# Patient Record
Sex: FEMALE | Race: OTHER | NOT HISPANIC OR LATINO | Employment: OTHER | URBAN - METROPOLITAN AREA
[De-identification: names, ages, dates, MRNs, and addresses within clinical notes are randomized per-mention and may not be internally consistent; named-entity substitution may affect disease eponyms.]

---

## 2023-12-21 ENCOUNTER — APPOINTMENT (OUTPATIENT)
Dept: GENERAL RADIOLOGY | Facility: HOSPITAL | Age: 63
End: 2023-12-21

## 2023-12-21 ENCOUNTER — HOSPITAL ENCOUNTER (INPATIENT)
Facility: HOSPITAL | Age: 63
LOS: 8 days | Discharge: HOME | End: 2023-12-29
Attending: EMERGENCY MEDICINE | Admitting: INTERNAL MEDICINE

## 2023-12-21 ENCOUNTER — APPOINTMENT (OUTPATIENT)
Dept: CT IMAGING | Facility: HOSPITAL | Age: 63
End: 2023-12-21

## 2023-12-21 DIAGNOSIS — R13.10 DYSPHAGIA, UNSPECIFIED TYPE: ICD-10-CM

## 2023-12-21 DIAGNOSIS — E11.11 DIABETIC KETOACIDOSIS WITH COMA ASSOCIATED WITH TYPE 2 DIABETES MELLITUS: ICD-10-CM

## 2023-12-21 DIAGNOSIS — G93.41 SEPSIS WITH ENCEPHALOPATHY WITHOUT SEPTIC SHOCK, DUE TO UNSPECIFIED ORGANISM: Primary | ICD-10-CM

## 2023-12-21 DIAGNOSIS — R65.20 SEPSIS WITH ENCEPHALOPATHY WITHOUT SEPTIC SHOCK, DUE TO UNSPECIFIED ORGANISM: Primary | ICD-10-CM

## 2023-12-21 DIAGNOSIS — A41.9 SEPSIS WITH ENCEPHALOPATHY WITHOUT SEPTIC SHOCK, DUE TO UNSPECIFIED ORGANISM: Primary | ICD-10-CM

## 2023-12-21 PROBLEM — E11.10 DKA (DIABETIC KETOACIDOSIS): Status: ACTIVE | Noted: 2023-12-21

## 2023-12-21 LAB
ALBUMIN SERPL-MCNC: 5.3 G/DL (ref 3.5–5.2)
ALBUMIN/GLOB SERPL: 1.4 G/DL
ALP SERPL-CCNC: 239 U/L (ref 39–117)
ALT SERPL W P-5'-P-CCNC: 33 U/L (ref 1–33)
AMMONIA BLD-SCNC: 31 UMOL/L (ref 11–51)
AMPHET+METHAMPHET UR QL: NEGATIVE
ANION GAP SERPL CALCULATED.3IONS-SCNC: 37 MMOL/L (ref 5–15)
ARTERIAL PATENCY WRIST A: POSITIVE
AST SERPL-CCNC: 18 U/L (ref 1–32)
ATMOSPHERIC PRESS: 758.1 MMHG
B-OH-BUTYR SERPL-SCNC: 10.9 MMOL/L (ref 0.02–0.27)
BARBITURATES UR QL SCN: NEGATIVE
BASE EXCESS BLDA CALC-SCNC: -10.9 MMOL/L (ref 0–2)
BDY SITE: ABNORMAL
BENZODIAZ UR QL SCN: NEGATIVE
BILIRUB SERPL-MCNC: 0.5 MG/DL (ref 0–1.2)
BUN SERPL-MCNC: 63 MG/DL (ref 8–23)
BUN/CREAT SERPL: 24.6 (ref 7–25)
CALCIUM SPEC-SCNC: 10.8 MG/DL (ref 8.6–10.5)
CANNABINOIDS SERPL QL: NEGATIVE
CHLORIDE SERPL-SCNC: 98 MMOL/L (ref 98–107)
CO2 BLDA-SCNC: 14.6 MMOL/L (ref 23–27)
CO2 SERPL-SCNC: 16 MMOL/L (ref 22–29)
COCAINE UR QL: NEGATIVE
CREAT SERPL-MCNC: 2.56 MG/DL (ref 0.57–1)
D-LACTATE SERPL-SCNC: 5.6 MMOL/L (ref 0.5–2)
DEPRECATED RDW RBC AUTO: 47.1 FL (ref 37–54)
DEVICE COMMENT: ABNORMAL
EGFRCR SERPLBLD CKD-EPI 2021: 20.4 ML/MIN/1.73
ERYTHROCYTE [DISTWIDTH] IN BLOOD BY AUTOMATED COUNT: 12.8 % (ref 12.3–15.4)
ETHANOL BLD-MCNC: <10 MG/DL (ref 0–10)
ETHANOL UR QL: <0.01 %
FENTANYL UR-MCNC: NEGATIVE NG/ML
GLOBULIN UR ELPH-MCNC: 3.7 GM/DL
GLUCOSE SERPL-MCNC: 1004 MG/DL (ref 65–99)
HCO3 BLDA-SCNC: 13.7 MMOL/L (ref 22–28)
HCT VFR BLD AUTO: 59 % (ref 34–46.6)
HEMODILUTION: NO
HGB BLD-MCNC: 18.3 G/DL (ref 12–15.9)
LYMPHOCYTES # BLD MANUAL: 1.48 10*3/MM3 (ref 0.7–3.1)
LYMPHOCYTES NFR BLD MANUAL: 3.1 % (ref 5–12)
Lab: ABNORMAL
MCH RBC QN AUTO: 31 PG (ref 26.6–33)
MCHC RBC AUTO-ENTMCNC: 31 G/DL (ref 31.5–35.7)
MCV RBC AUTO: 99.8 FL (ref 79–97)
METHADONE UR QL SCN: NEGATIVE
MODALITY: ABNORMAL
MONOCYTES # BLD: 0.49 10*3/MM3 (ref 0.1–0.9)
NEUTROPHILS # BLD AUTO: 13.94 10*3/MM3 (ref 1.7–7)
NEUTROPHILS NFR BLD MANUAL: 87.6 % (ref 42.7–76)
NOTIFIED WHO: ABNORMAL
OPIATES UR QL: NEGATIVE
OXYCODONE UR QL SCN: NEGATIVE
PCO2 BLDA: 28.3 MM HG (ref 35–45)
PH BLDA: 7.29 PH UNITS (ref 7.35–7.45)
PLAT MORPH BLD: NORMAL
PLATELET # BLD AUTO: 345 10*3/MM3 (ref 140–450)
PMV BLD AUTO: 13.2 FL (ref 6–12)
PO2 BLDA: 97.4 MM HG (ref 80–100)
POTASSIUM SERPL-SCNC: 5 MMOL/L (ref 3.5–5.2)
PROT SERPL-MCNC: 9 G/DL (ref 6–8.5)
RBC # BLD AUTO: 5.91 10*6/MM3 (ref 3.77–5.28)
RBC MORPH BLD: NORMAL
READ BACK: ABNORMAL
SAO2 % BLDCOA: 96.9 % (ref 92–98.5)
SODIUM SERPL-SCNC: 151 MMOL/L (ref 136–145)
TOTAL RATE: 16 BREATHS/MINUTE
TROPONIN T SERPL HS-MCNC: 13 NG/L
VARIANT LYMPHS NFR BLD MANUAL: 9.3 % (ref 19.6–45.3)
WBC MORPH BLD: NORMAL
WBC NRBC COR # BLD AUTO: 15.91 10*3/MM3 (ref 3.4–10.8)

## 2023-12-21 PROCEDURE — 82803 BLOOD GASES ANY COMBINATION: CPT

## 2023-12-21 PROCEDURE — 82140 ASSAY OF AMMONIA: CPT | Performed by: EMERGENCY MEDICINE

## 2023-12-21 PROCEDURE — 99285 EMERGENCY DEPT VISIT HI MDM: CPT

## 2023-12-21 PROCEDURE — 93010 ELECTROCARDIOGRAM REPORT: CPT | Performed by: INTERNAL MEDICINE

## 2023-12-21 PROCEDURE — 82077 ASSAY SPEC XCP UR&BREATH IA: CPT | Performed by: EMERGENCY MEDICINE

## 2023-12-21 PROCEDURE — 80053 COMPREHEN METABOLIC PANEL: CPT | Performed by: EMERGENCY MEDICINE

## 2023-12-21 PROCEDURE — 93005 ELECTROCARDIOGRAM TRACING: CPT | Performed by: EMERGENCY MEDICINE

## 2023-12-21 PROCEDURE — 87040 BLOOD CULTURE FOR BACTERIA: CPT | Performed by: EMERGENCY MEDICINE

## 2023-12-21 PROCEDURE — 80307 DRUG TEST PRSMV CHEM ANLYZR: CPT | Performed by: EMERGENCY MEDICINE

## 2023-12-21 PROCEDURE — 71045 X-RAY EXAM CHEST 1 VIEW: CPT

## 2023-12-21 PROCEDURE — 85007 BL SMEAR W/DIFF WBC COUNT: CPT | Performed by: EMERGENCY MEDICINE

## 2023-12-21 PROCEDURE — 36600 WITHDRAWAL OF ARTERIAL BLOOD: CPT

## 2023-12-21 PROCEDURE — 25810000003 SODIUM CHLORIDE 0.9 % SOLUTION: Performed by: EMERGENCY MEDICINE

## 2023-12-21 PROCEDURE — 70450 CT HEAD/BRAIN W/O DYE: CPT

## 2023-12-21 PROCEDURE — 83605 ASSAY OF LACTIC ACID: CPT | Performed by: EMERGENCY MEDICINE

## 2023-12-21 PROCEDURE — 85025 COMPLETE CBC W/AUTO DIFF WBC: CPT | Performed by: EMERGENCY MEDICINE

## 2023-12-21 PROCEDURE — 84100 ASSAY OF PHOSPHORUS: CPT | Performed by: EMERGENCY MEDICINE

## 2023-12-21 PROCEDURE — 36415 COLL VENOUS BLD VENIPUNCTURE: CPT

## 2023-12-21 PROCEDURE — 83036 HEMOGLOBIN GLYCOSYLATED A1C: CPT | Performed by: EMERGENCY MEDICINE

## 2023-12-21 PROCEDURE — 83735 ASSAY OF MAGNESIUM: CPT | Performed by: EMERGENCY MEDICINE

## 2023-12-21 PROCEDURE — 84484 ASSAY OF TROPONIN QUANT: CPT | Performed by: EMERGENCY MEDICINE

## 2023-12-21 PROCEDURE — 82010 KETONE BODYS QUAN: CPT | Performed by: EMERGENCY MEDICINE

## 2023-12-21 RX ORDER — SODIUM CHLORIDE 9 MG/ML
250 INJECTION, SOLUTION INTRAVENOUS CONTINUOUS PRN
Status: DISCONTINUED | OUTPATIENT
Start: 2023-12-21 | End: 2023-12-29

## 2023-12-21 RX ORDER — SODIUM CHLORIDE AND POTASSIUM CHLORIDE 150; 900 MG/100ML; MG/100ML
250 INJECTION, SOLUTION INTRAVENOUS CONTINUOUS PRN
Status: DISCONTINUED | OUTPATIENT
Start: 2023-12-21 | End: 2023-12-29

## 2023-12-21 RX ORDER — SODIUM CHLORIDE 9 MG/ML
40 INJECTION, SOLUTION INTRAVENOUS AS NEEDED
Status: DISCONTINUED | OUTPATIENT
Start: 2023-12-21 | End: 2023-12-29

## 2023-12-21 RX ORDER — DEXTROSE MONOHYDRATE 25 G/50ML
10-50 INJECTION, SOLUTION INTRAVENOUS
Status: DISCONTINUED | OUTPATIENT
Start: 2023-12-21 | End: 2023-12-29

## 2023-12-21 RX ORDER — SODIUM CHLORIDE 450 MG/100ML
250 INJECTION, SOLUTION INTRAVENOUS CONTINUOUS PRN
Status: DISCONTINUED | OUTPATIENT
Start: 2023-12-21 | End: 2023-12-29

## 2023-12-21 RX ORDER — DEXTROSE MONOHYDRATE, SODIUM CHLORIDE, AND POTASSIUM CHLORIDE 50; 1.49; 4.5 G/1000ML; G/1000ML; G/1000ML
150 INJECTION, SOLUTION INTRAVENOUS CONTINUOUS PRN
Status: DISCONTINUED | OUTPATIENT
Start: 2023-12-21 | End: 2023-12-29

## 2023-12-21 RX ORDER — SODIUM CHLORIDE 0.9 % (FLUSH) 0.9 %
10 SYRINGE (ML) INJECTION AS NEEDED
Status: DISCONTINUED | OUTPATIENT
Start: 2023-12-21 | End: 2023-12-29 | Stop reason: HOSPADM

## 2023-12-21 RX ORDER — DEXTROSE AND SODIUM CHLORIDE 5; .45 G/100ML; G/100ML
150 INJECTION, SOLUTION INTRAVENOUS CONTINUOUS PRN
Status: DISCONTINUED | OUTPATIENT
Start: 2023-12-21 | End: 2023-12-29

## 2023-12-21 RX ORDER — SODIUM CHLORIDE AND POTASSIUM CHLORIDE 300; 900 MG/100ML; MG/100ML
250 INJECTION, SOLUTION INTRAVENOUS CONTINUOUS PRN
Status: DISCONTINUED | OUTPATIENT
Start: 2023-12-21 | End: 2023-12-29

## 2023-12-21 RX ORDER — SODIUM CHLORIDE AND POTASSIUM CHLORIDE 150; 450 MG/100ML; MG/100ML
250 INJECTION, SOLUTION INTRAVENOUS CONTINUOUS PRN
Status: DISCONTINUED | OUTPATIENT
Start: 2023-12-21 | End: 2023-12-29

## 2023-12-21 RX ORDER — DEXTROSE MONOHYDRATE, SODIUM CHLORIDE, AND POTASSIUM CHLORIDE 50; 1.49; 9 G/1000ML; G/1000ML; G/1000ML
150 INJECTION, SOLUTION INTRAVENOUS CONTINUOUS PRN
Status: DISCONTINUED | OUTPATIENT
Start: 2023-12-21 | End: 2023-12-29

## 2023-12-21 RX ORDER — IBUPROFEN 600 MG/1
1 TABLET ORAL
Status: DISCONTINUED | OUTPATIENT
Start: 2023-12-21 | End: 2023-12-29

## 2023-12-21 RX ORDER — SODIUM CHLORIDE 0.9 % (FLUSH) 0.9 %
10 SYRINGE (ML) INJECTION AS NEEDED
Status: DISCONTINUED | OUTPATIENT
Start: 2023-12-21 | End: 2023-12-29

## 2023-12-21 RX ORDER — DEXTROSE MONOHYDRATE, SODIUM CHLORIDE, AND POTASSIUM CHLORIDE 50; 2.98; 9 G/1000ML; G/1000ML; G/1000ML
150 INJECTION, SOLUTION INTRAVENOUS CONTINUOUS PRN
Status: DISCONTINUED | OUTPATIENT
Start: 2023-12-21 | End: 2023-12-29

## 2023-12-21 RX ORDER — NICOTINE POLACRILEX 4 MG
15 LOZENGE BUCCAL
Status: DISCONTINUED | OUTPATIENT
Start: 2023-12-21 | End: 2023-12-29

## 2023-12-21 RX ORDER — DEXTROSE AND SODIUM CHLORIDE 5; .9 G/100ML; G/100ML
150 INJECTION, SOLUTION INTRAVENOUS CONTINUOUS PRN
Status: DISCONTINUED | OUTPATIENT
Start: 2023-12-21 | End: 2023-12-29

## 2023-12-21 RX ORDER — SODIUM CHLORIDE 0.9 % (FLUSH) 0.9 %
10 SYRINGE (ML) INJECTION EVERY 12 HOURS SCHEDULED
Status: DISCONTINUED | OUTPATIENT
Start: 2023-12-22 | End: 2023-12-29

## 2023-12-21 RX ORDER — DEXTROSE MONOHYDRATE, SODIUM CHLORIDE, AND POTASSIUM CHLORIDE 50; 2.98; 4.5 G/1000ML; G/1000ML; G/1000ML
150 INJECTION, SOLUTION INTRAVENOUS CONTINUOUS PRN
Status: DISCONTINUED | OUTPATIENT
Start: 2023-12-21 | End: 2023-12-29

## 2023-12-21 RX ADMIN — SODIUM CHLORIDE 1000 ML: 9 INJECTION, SOLUTION INTRAVENOUS at 22:35

## 2023-12-21 NOTE — LETTER
[facility] CASE MAN  [place] [facility] [id number]  [phone]        12/29/2023      Patient: Marnie Middleton  YOB: 1960  Date of Visit: 12/21/2023      To whom it may concern,     Marnie Middleton suffered a medical emergency on 12/21/2023 and was admitted to [facility] on same date. Marnie has been in hospital and missed her flight due to this. Marnie to be discharged from hospital today 12/29/2023.       Gayle Harry RN per Dr. Poli Warren

## 2023-12-22 LAB
ANION GAP SERPL CALCULATED.3IONS-SCNC: 11 MMOL/L (ref 5–15)
ANION GAP SERPL CALCULATED.3IONS-SCNC: 14.7 MMOL/L (ref 5–15)
ANION GAP SERPL CALCULATED.3IONS-SCNC: 20 MMOL/L (ref 5–15)
ANION GAP SERPL CALCULATED.3IONS-SCNC: 23.9 MMOL/L (ref 5–15)
ANION GAP SERPL CALCULATED.3IONS-SCNC: 31 MMOL/L (ref 5–15)
ANION GAP SERPL CALCULATED.3IONS-SCNC: 35 MMOL/L (ref 5–15)
BUN SERPL-MCNC: 27 MG/DL (ref 8–23)
BUN SERPL-MCNC: 37 MG/DL (ref 8–23)
BUN SERPL-MCNC: 38 MG/DL (ref 8–23)
BUN SERPL-MCNC: 40 MG/DL (ref 8–23)
BUN SERPL-MCNC: 55 MG/DL (ref 8–23)
BUN SERPL-MCNC: 61 MG/DL (ref 8–23)
BUN/CREAT SERPL: 22 (ref 7–25)
BUN/CREAT SERPL: 25.9 (ref 7–25)
BUN/CREAT SERPL: 26.1 (ref 7–25)
BUN/CREAT SERPL: 27 (ref 7–25)
BUN/CREAT SERPL: 27.1 (ref 7–25)
BUN/CREAT SERPL: 29.2 (ref 7–25)
CALCIUM SPEC-SCNC: 7.6 MG/DL (ref 8.6–10.5)
CALCIUM SPEC-SCNC: 7.6 MG/DL (ref 8.6–10.5)
CALCIUM SPEC-SCNC: 7.9 MG/DL (ref 8.6–10.5)
CALCIUM SPEC-SCNC: 8.4 MG/DL (ref 8.6–10.5)
CALCIUM SPEC-SCNC: 8.9 MG/DL (ref 8.6–10.5)
CALCIUM SPEC-SCNC: 9.4 MG/DL (ref 8.6–10.5)
CHLORIDE SERPL-SCNC: 111 MMOL/L (ref 98–107)
CHLORIDE SERPL-SCNC: 118 MMOL/L (ref 98–107)
CHLORIDE SERPL-SCNC: 124 MMOL/L (ref 98–107)
CHLORIDE SERPL-SCNC: 127 MMOL/L (ref 98–107)
CHLORIDE SERPL-SCNC: 129 MMOL/L (ref 98–107)
CHLORIDE SERPL-SCNC: 131 MMOL/L (ref 98–107)
CO2 SERPL-SCNC: 10 MMOL/L (ref 22–29)
CO2 SERPL-SCNC: 12.1 MMOL/L (ref 22–29)
CO2 SERPL-SCNC: 16 MMOL/L (ref 22–29)
CO2 SERPL-SCNC: 18 MMOL/L (ref 22–29)
CO2 SERPL-SCNC: 18.3 MMOL/L (ref 22–29)
CO2 SERPL-SCNC: 9 MMOL/L (ref 22–29)
CREAT SERPL-MCNC: 1.23 MG/DL (ref 0.57–1)
CREAT SERPL-MCNC: 1.3 MG/DL (ref 0.57–1)
CREAT SERPL-MCNC: 1.42 MG/DL (ref 0.57–1)
CREAT SERPL-MCNC: 1.48 MG/DL (ref 0.57–1)
CREAT SERPL-MCNC: 2.12 MG/DL (ref 0.57–1)
CREAT SERPL-MCNC: 2.25 MG/DL (ref 0.57–1)
D-LACTATE SERPL-SCNC: 1.7 MMOL/L (ref 0.5–2)
D-LACTATE SERPL-SCNC: 1.7 MMOL/L (ref 0.5–2)
D-LACTATE SERPL-SCNC: 4.3 MMOL/L (ref 0.5–2)
D-LACTATE SERPL-SCNC: 4.3 MMOL/L (ref 0.5–2)
D-LACTATE SERPL-SCNC: 5.3 MMOL/L (ref 0.5–2)
EGFRCR SERPLBLD CKD-EPI 2021: 23.8 ML/MIN/1.73
EGFRCR SERPLBLD CKD-EPI 2021: 25.6 ML/MIN/1.73
EGFRCR SERPLBLD CKD-EPI 2021: 39.4 ML/MIN/1.73
EGFRCR SERPLBLD CKD-EPI 2021: 41.4 ML/MIN/1.73
EGFRCR SERPLBLD CKD-EPI 2021: 46 ML/MIN/1.73
EGFRCR SERPLBLD CKD-EPI 2021: 49.2 ML/MIN/1.73
GEN 5 2HR TROPONIN T REFLEX: 11 NG/L
GLUCOSE BLDC GLUCOMTR-MCNC: 150 MG/DL (ref 70–130)
GLUCOSE BLDC GLUCOMTR-MCNC: 212 MG/DL (ref 70–130)
GLUCOSE BLDC GLUCOMTR-MCNC: 213 MG/DL (ref 70–130)
GLUCOSE BLDC GLUCOMTR-MCNC: 226 MG/DL (ref 70–130)
GLUCOSE BLDC GLUCOMTR-MCNC: 227 MG/DL (ref 70–130)
GLUCOSE BLDC GLUCOMTR-MCNC: 232 MG/DL (ref 70–130)
GLUCOSE BLDC GLUCOMTR-MCNC: 236 MG/DL (ref 70–130)
GLUCOSE BLDC GLUCOMTR-MCNC: 240 MG/DL (ref 70–130)
GLUCOSE BLDC GLUCOMTR-MCNC: 243 MG/DL (ref 70–130)
GLUCOSE BLDC GLUCOMTR-MCNC: 265 MG/DL (ref 70–130)
GLUCOSE BLDC GLUCOMTR-MCNC: 271 MG/DL (ref 70–130)
GLUCOSE BLDC GLUCOMTR-MCNC: 280 MG/DL (ref 70–130)
GLUCOSE BLDC GLUCOMTR-MCNC: 286 MG/DL (ref 70–130)
GLUCOSE BLDC GLUCOMTR-MCNC: 304 MG/DL (ref 70–130)
GLUCOSE BLDC GLUCOMTR-MCNC: 379 MG/DL (ref 70–130)
GLUCOSE BLDC GLUCOMTR-MCNC: 427 MG/DL (ref 70–130)
GLUCOSE BLDC GLUCOMTR-MCNC: 506 MG/DL (ref 70–130)
GLUCOSE BLDC GLUCOMTR-MCNC: 554 MG/DL (ref 70–130)
GLUCOSE SERPL-MCNC: 238 MG/DL (ref 65–99)
GLUCOSE SERPL-MCNC: 293 MG/DL (ref 65–99)
GLUCOSE SERPL-MCNC: 301 MG/DL (ref 65–99)
GLUCOSE SERPL-MCNC: 460 MG/DL (ref 65–99)
GLUCOSE SERPL-MCNC: 748 MG/DL (ref 65–99)
GLUCOSE SERPL-MCNC: 795 MG/DL (ref 65–99)
HBA1C MFR BLD: 14.7 % (ref 4.8–5.6)
MAGNESIUM SERPL-MCNC: 2.7 MG/DL (ref 1.6–2.4)
MAGNESIUM SERPL-MCNC: 2.8 MG/DL (ref 1.6–2.4)
MAGNESIUM SERPL-MCNC: 2.9 MG/DL (ref 1.6–2.4)
MAGNESIUM SERPL-MCNC: 3.5 MG/DL (ref 1.6–2.4)
MAGNESIUM SERPL-MCNC: 3.5 MG/DL (ref 1.6–2.4)
MAGNESIUM SERPL-MCNC: 4.3 MG/DL (ref 1.6–2.4)
OSMOLALITY SERPL: 417 MOSM/KG (ref 280–301)
PHOSPHATE SERPL-MCNC: 1 MG/DL (ref 2.5–4.5)
PHOSPHATE SERPL-MCNC: 1.7 MG/DL (ref 2.5–4.5)
PHOSPHATE SERPL-MCNC: 1.9 MG/DL (ref 2.5–4.5)
PHOSPHATE SERPL-MCNC: 4.2 MG/DL (ref 2.5–4.5)
PHOSPHATE SERPL-MCNC: 4.5 MG/DL (ref 2.5–4.5)
PHOSPHATE SERPL-MCNC: 6.8 MG/DL (ref 2.5–4.5)
POTASSIUM SERPL-SCNC: 3.9 MMOL/L (ref 3.5–5.2)
POTASSIUM SERPL-SCNC: 3.9 MMOL/L (ref 3.5–5.2)
POTASSIUM SERPL-SCNC: 4 MMOL/L (ref 3.5–5.2)
POTASSIUM SERPL-SCNC: 4.1 MMOL/L (ref 3.5–5.2)
POTASSIUM SERPL-SCNC: 4.1 MMOL/L (ref 3.5–5.2)
POTASSIUM SERPL-SCNC: 4.7 MMOL/L (ref 3.5–5.2)
QT INTERVAL: 321 MS
QTC INTERVAL: 458 MS
SODIUM SERPL-SCNC: 155 MMOL/L (ref 136–145)
SODIUM SERPL-SCNC: 158 MMOL/L (ref 136–145)
SODIUM SERPL-SCNC: 159 MMOL/L (ref 136–145)
SODIUM SERPL-SCNC: 160 MMOL/L (ref 136–145)
SODIUM SERPL-SCNC: 163 MMOL/L (ref 136–145)
SODIUM SERPL-SCNC: 164 MMOL/L (ref 136–145)
TROPONIN T DELTA: -2 NG/L

## 2023-12-22 PROCEDURE — 83605 ASSAY OF LACTIC ACID: CPT | Performed by: EMERGENCY MEDICINE

## 2023-12-22 PROCEDURE — 36415 COLL VENOUS BLD VENIPUNCTURE: CPT | Performed by: INTERNAL MEDICINE

## 2023-12-22 PROCEDURE — 83930 ASSAY OF BLOOD OSMOLALITY: CPT | Performed by: EMERGENCY MEDICINE

## 2023-12-22 PROCEDURE — 80048 BASIC METABOLIC PNL TOTAL CA: CPT | Performed by: INTERNAL MEDICINE

## 2023-12-22 PROCEDURE — 84484 ASSAY OF TROPONIN QUANT: CPT | Performed by: EMERGENCY MEDICINE

## 2023-12-22 PROCEDURE — 25010000002 POTASSIUM CHLORIDE PER 2 MEQ: Performed by: INTERNAL MEDICINE

## 2023-12-22 PROCEDURE — 25810000003 SEPSIS FLUID NS 0.9 % SOLUTION: Performed by: EMERGENCY MEDICINE

## 2023-12-22 PROCEDURE — 83735 ASSAY OF MAGNESIUM: CPT | Performed by: INTERNAL MEDICINE

## 2023-12-22 PROCEDURE — 82948 REAGENT STRIP/BLOOD GLUCOSE: CPT

## 2023-12-22 PROCEDURE — 84100 ASSAY OF PHOSPHORUS: CPT | Performed by: INTERNAL MEDICINE

## 2023-12-22 PROCEDURE — 25810000003 SODIUM CHLORIDE 0.9 % SOLUTION 500 ML FLEX CONT: Performed by: INTERNAL MEDICINE

## 2023-12-22 PROCEDURE — 25010000002 METOCLOPRAMIDE PER 10 MG: Performed by: INTERNAL MEDICINE

## 2023-12-22 PROCEDURE — 87040 BLOOD CULTURE FOR BACTERIA: CPT | Performed by: EMERGENCY MEDICINE

## 2023-12-22 PROCEDURE — 83605 ASSAY OF LACTIC ACID: CPT | Performed by: INTERNAL MEDICINE

## 2023-12-22 PROCEDURE — 25810000003 DEXTROSE 5 % WITH KCL 20 MEQ 20 MEQ/L SOLUTION: Performed by: INTERNAL MEDICINE

## 2023-12-22 PROCEDURE — 0 DEXTROSE 5 % SOLUTION: Performed by: INTERNAL MEDICINE

## 2023-12-22 PROCEDURE — 25810000003 SEPSIS FLUID NS 0.9 % SOLUTION: Performed by: INTERNAL MEDICINE

## 2023-12-22 PROCEDURE — 25010000002 PIPERACILLIN SOD-TAZOBACTAM PER 1 G: Performed by: EMERGENCY MEDICINE

## 2023-12-22 PROCEDURE — 80048 BASIC METABOLIC PNL TOTAL CA: CPT

## 2023-12-22 RX ORDER — POTASSIUM CHLORIDE, DEXTROSE MONOHYDRATE 150; 5 MG/100ML; G/100ML
150 INJECTION, SOLUTION INTRAVENOUS CONTINUOUS
Status: DISCONTINUED | OUTPATIENT
Start: 2023-12-22 | End: 2023-12-22

## 2023-12-22 RX ORDER — SODIUM CHLORIDE 450 MG/100ML
150 INJECTION, SOLUTION INTRAVENOUS CONTINUOUS
Status: DISCONTINUED | OUTPATIENT
Start: 2023-12-22 | End: 2023-12-22

## 2023-12-22 RX ORDER — METOCLOPRAMIDE HYDROCHLORIDE 5 MG/ML
10 INJECTION INTRAMUSCULAR; INTRAVENOUS ONCE
Status: COMPLETED | OUTPATIENT
Start: 2023-12-22 | End: 2023-12-22

## 2023-12-22 RX ORDER — SODIUM CHLORIDE 450 MG/100ML
150 INJECTION, SOLUTION INTRAVENOUS CONTINUOUS
Status: DISCONTINUED | OUTPATIENT
Start: 2023-12-22 | End: 2023-12-23

## 2023-12-22 RX ORDER — DEXTROSE MONOHYDRATE 50 MG/ML
150 INJECTION, SOLUTION INTRAVENOUS CONTINUOUS
Status: DISCONTINUED | OUTPATIENT
Start: 2023-12-22 | End: 2023-12-22 | Stop reason: ALTCHOICE

## 2023-12-22 RX ADMIN — POTASSIUM CHLORIDE AND SODIUM CHLORIDE 250 ML/HR: 450; 150 INJECTION, SOLUTION INTRAVENOUS at 08:06

## 2023-12-22 RX ADMIN — SODIUM CHLORIDE 150 ML/HR: 4.5 INJECTION, SOLUTION INTRAVENOUS at 11:30

## 2023-12-22 RX ADMIN — INSULIN HUMAN 5.4 UNITS/HR: 1 INJECTION, SOLUTION INTRAVENOUS at 00:26

## 2023-12-22 RX ADMIN — POTASSIUM CHLORIDE AND SODIUM CHLORIDE 250 ML/HR: 450; 150 INJECTION, SOLUTION INTRAVENOUS at 12:23

## 2023-12-22 RX ADMIN — Medication 10 ML: at 08:36

## 2023-12-22 RX ADMIN — POTASSIUM CHLORIDE AND DEXTROSE MONOHYDRATE 150 ML/HR: 150; 5 INJECTION, SOLUTION INTRAVENOUS at 14:30

## 2023-12-22 RX ADMIN — POTASSIUM PHOSPHATE, MONOBASIC POTASSIUM PHOSPHATE, DIBASIC 20 MMOL: 224; 236 INJECTION, SOLUTION, CONCENTRATE INTRAVENOUS at 15:00

## 2023-12-22 RX ADMIN — POTASSIUM CHLORIDE AND DEXTROSE MONOHYDRATE 150 ML/HR: 150; 5 INJECTION, SOLUTION INTRAVENOUS at 21:40

## 2023-12-22 RX ADMIN — INSULIN HUMAN 31.3 UNITS/HR: 1 INJECTION, SOLUTION INTRAVENOUS at 20:16

## 2023-12-22 RX ADMIN — INSULIN HUMAN 43.5 UNITS/HR: 1 INJECTION, SOLUTION INTRAVENOUS at 22:42

## 2023-12-22 RX ADMIN — INSULIN HUMAN 25.6 UNITS/HR: 1 INJECTION, SOLUTION INTRAVENOUS at 22:54

## 2023-12-22 RX ADMIN — SODIUM CHLORIDE 150 ML/HR: 4.5 INJECTION, SOLUTION INTRAVENOUS at 22:55

## 2023-12-22 RX ADMIN — TAZOBACTAM SODIUM AND PIPERACILLIN SODIUM 3.38 G: 375; 3 INJECTION, SOLUTION INTRAVENOUS at 00:08

## 2023-12-22 RX ADMIN — INSULIN HUMAN 10.1 UNITS/HR: 1 INJECTION, SOLUTION INTRAVENOUS at 12:51

## 2023-12-22 RX ADMIN — SODIUM CHLORIDE 1938 ML: 9 INJECTION, SOLUTION INTRAVENOUS at 00:08

## 2023-12-22 RX ADMIN — POTASSIUM CHLORIDE AND SODIUM CHLORIDE 250 ML/HR: 450; 150 INJECTION, SOLUTION INTRAVENOUS at 03:03

## 2023-12-22 RX ADMIN — Medication 10 ML: at 20:24

## 2023-12-22 RX ADMIN — DEXTROSE MONOHYDRATE 150 ML/HR: 50 INJECTION, SOLUTION INTRAVENOUS at 13:49

## 2023-12-22 RX ADMIN — SODIUM CHLORIDE 1000 ML: 9 INJECTION, SOLUTION INTRAVENOUS at 02:54

## 2023-12-22 RX ADMIN — METOCLOPRAMIDE 10 MG: 5 INJECTION, SOLUTION INTRAMUSCULAR; INTRAVENOUS at 13:16

## 2023-12-22 RX ADMIN — Medication 10 ML: at 02:55

## 2023-12-22 NOTE — PLAN OF CARE
Goal Outcome Evaluation:         Spoke with RN. Patient not appropriate for swallow evaluation this AM. Will continue to follow and complete evaluation as appropriate.

## 2023-12-22 NOTE — PLAN OF CARE
Goal Outcome Evaluation:      Arrived to unit 0100, septic bolus protocol finished, glucomander protocol followed (see all labs), VSS, pt oriented to self and place only, admissions questions were unable to be answered, family friends (whom pt was in town visiting were unable to provide any medical information). Sons names are listed in chart and will try contacting in AM.

## 2023-12-22 NOTE — PROGRESS NOTES
Patient was switched to half-normal saline but continued increase in sodium levels.  Therefore switched to D5 water.  Will review next BMP.  Patient will therefore need to be transition to subcu insulin NON-Glucomander order set when her DKA is resolved.    Electronically signed by Nahun Hernandez MD, 12/22/23, 3:39 PM EST.

## 2023-12-22 NOTE — CASE MANAGEMENT/SOCIAL WORK
Continued Stay Note  [facility]     Patient Name: Marnie Middleton  MRN: [id number]  Today's Date: 12/22/2023    Admit Date: 12/21/2023    Plan: Follow up with patient or son Baljeet to complete screening and discharge planning assessment   Discharge Plan       Row Name 12/22/23 1416       Plan    Plan Follow up with patient or son Baljeet to complete screening and discharge planning assessment    Plan Comments CCP noted patient is confused. Outbound call to son Baljeet and [facility] left requesting callback. Outbound call to son Zeke who states he has not been in contact with his mother in a few years and directs CCP to speak with Baljeet for screening. Zeke states patient resides in [place], [place] but was in [place] visiting her friend Gloria. At discharge Zeke states they plan for Gloria to transport patient to the airport to return home where her sister will be picking her up. Zeke consents to CCP staff speaking with friend Gloria for any discharge planning needs. CCP following clinical course to assist with any discharge planning needs. Thalia BECKHAM RN/CCP                   Discharge Codes    No documentation.                       Gayle Carmona

## 2023-12-22 NOTE — H&P
"  History and Physical    Patient Name: Marnie Middleton  Age/Sex: 63 y.o. female  : 1960  MRN: [id number]    Date of Admission: 2023  Date of Encounter Visit: 23  Encounter Provider: Carey Acuna MD  Place of Service: [facility]   Patient Care Team:  Provider, No Known as PCP - General      Subjective:     Chief Complaint: DKA    History of Present Illness:  Marnie Middleton is a 63 y.o. female with no known past medical problem, unsure if the patient follow-up closely with her provider but she is from out of town and she is visiting and the family do not have the details.  Patient  Arthropathy which may getting adequate history very challenging  She was noted to be acting a little bit weird today being more lethargic, and when they checked on her she was in her bed sleeping, difficult to wake up and lethargic so she was brought in and was found to be severely dehydrated and her blood sugar was above 1000.  Workup was positive for diabetic ketoacidosis and patient has no prior diagnosis of diabetes but lately she has been having polyuria with polydipsia and most of her symptoms over the last few days do fit the profile.  Her hemoglobin A1c was 14.7, the patient is complaining of very dry mouth  There was no fever or chills no productive cough, no vomiting, no diarrhea, she was having excessive urination, she was drinking a lot of sodas according to the family over the last few days.  They are not aware of any weight loss and the patient is unable to answer that question  Patient reported that she takes some ibuprofen over-the-counter otherwise she does not take any prescription medication    Pulmonary Functions Testing Results:    No results found for: \"FEV1\", \"FVC\", \"QZX5LMN\", \"TLC\", \"DLCO\"    Review of Systems:   Review of Systems  As per above otherwise limited given her current mental status    Past Medical History:  No past medical history on file.    No past surgical history on " file.    Home Medications:   (Not in a hospital admission)      Inpatient Medications:  Scheduled Meds:sepsis fluid NS, 30 mL/kg, Intravenous, Once  sepsis fluid NS, 30 mL/kg, Intravenous, Once  sodium chloride, 10 mL, Intravenous, Q12H  vancomycin, 20 mg/kg, Intravenous, Once      Continuous Infusions:dextrose 5 % and sodium chloride 0.45 %, 150 mL/hr  dextrose 5 % and sodium chloride 0.45 % with KCl 20 mEq/L, 150 mL/hr  dextrose 5 % and sodium chloride 0.45 % with KCl 40 mEq/L, 150 mL/hr  dextrose 5 % and sodium chloride 0.9 %, 150 mL/hr  dextrose 5 % and sodium chloride 0.9 % with KCl 20 mEq, 150 mL/hr  dextrose 5% and sodium chloride 0.9% with KCl 40 mEq/L, 150 mL/hr  insulin, 0-100 Units/hr, Last Rate: 5.4 Units/hr (12/22/23 0026)  sodium chloride 0.45 % 1,000 mL with potassium chloride 40 mEq infusion, 250 mL/hr  sodium chloride, 250 mL/hr  sodium chloride 0.45 % with KCl 20 mEq, 250 mL/hr  sodium chloride, 1,000 mL/hr  sodium chloride, 250 mL/hr  sodium chloride 0.9 % with KCl 20 mEq, 250 mL/hr  sodium chloride 0.9 % with KCl 40 mEq/L, 250 mL/hr      PRN Meds:.  Calcium Replacement - Follow Nurse / BPA Driven Protocol    dextrose    dextrose    dextrose 5 % and sodium chloride 0.45 %    dextrose 5 % and sodium chloride 0.45 % with KCl 20 mEq/L    dextrose 5 % and sodium chloride 0.45 % with KCl 40 mEq/L    dextrose 5 % and sodium chloride 0.9 %    dextrose 5 % and sodium chloride 0.9 % with KCl 20 mEq    dextrose 5% and sodium chloride 0.9% with KCl 40 mEq/L    glucagon (human recombinant)    Magnesium Standard Dose Replacement - Follow Nurse / BPA Driven Protocol    Phosphorus Replacement - Follow Nurse / BPA Driven Protocol    Potassium Replacement - Follow Nurse / BPA Driven Protocol    sodium chloride 0.45 % 1,000 mL with potassium chloride 40 mEq infusion    sodium chloride    sodium chloride 0.45 % with KCl 20 mEq    [COMPLETED] Insert Peripheral IV **AND** sodium chloride    sodium chloride    sodium  chloride    sodium chloride    sodium chloride 0.9 % with KCl 20 mEq    sodium chloride 0.9 % with KCl 40 mEq/L    Allergies:  Allergies   Allergen Reactions    Aspartame And Phenylalanine Headache    Aspirin Headache       Past Social History:  Social History     Socioeconomic History    Marital status: Single       Past Family History:  No family history on file.        Objective:   Temp:  [97.8 °F (36.6 °C)] 97.8 °F (36.6 °C)  Heart Rate:  [110-123] 123  Resp:  [24] 24  BP: (113-120)/(77-92) 113/77   SpO2:  [92 %-95 %] 95 %  on    Device (Oxygen Therapy): room air    Intake/Output Summary (Last 24 hours) at 12/22/2023 0041  Last data filed at 12/22/2023 0036  Gross per 24 hour   Intake 1025 ml   Output --   Net 1025 ml     Body mass index is 28.78 kg/m².      12/21/23  2331   Weight: 64.6 kg (142 lb 8 oz)     Weight change:     Physical Exam:   Physical Exam   General:  Sickly looking, lethargic but responsive                   Head:    Normocephalic, atraumatic.   Eyes:          Conjunctivae and sclerae normal, no icterus, PERRLA   Throat:   No oral lesions, no thrush, very dry mucous membrane   Neck:   Supple, trachea midline.   Lungs:     Normal chest on inspection, CTAB, no wheezes. No rhonchi. No crackles. Respirations regular, even and unlabored.     Heart:    Regular rhythm and normal rate.  No murmurs, gallops, or rubs noted.   Abdomen:     Soft, tender to palpation with no rebound , non-distended, positive bowel sounds.    Extremities:   No clubbing, cyanosis, or edema.     Pulses:   Pulses palpable and equal bilaterally.    Skin:   No bleeding or rash.   Neuro:   Non-focal.  Moves all extremities well.  Very lethargic   Psychiatric: Lethargic and withdrawn     Lab Review:   Results from last 7 days   Lab Units 12/21/23  2202   SODIUM mmol/L 151*   POTASSIUM mmol/L 5.0   CHLORIDE mmol/L 98   CO2 mmol/L 16.0*   BUN mg/dL 63*   CREATININE mg/dL 2.56*   GLUCOSE mg/dL 1,004*   CALCIUM mg/dL 10.8*   AST  "(SGOT) U/L 18   ALT (SGPT) U/L 33   ALBUMIN g/dL 5.3*     Results from last 7 days   Lab Units 12/21/23  2202   HSTROP T ng/L 13     Results from last 7 days   Lab Units 12/21/23  2202   WBC 10*3/mm3 15.91*   HEMOGLOBIN g/dL 18.3*   HEMATOCRIT % 59.0*   PLATELETS 10*3/mm3 345   MCV fL 99.8*   MCH pg 31.0   MCHC g/dL 31.0*   RDW % 12.8   RDW-SD fl 47.1   MPV fL 13.2*   NEUTROS ABS 10*3/mm3 13.94*         Results from last 7 days   Lab Units 12/21/23  2202   MAGNESIUM mg/dL 4.3*           Invalid input(s): \"LDLCALC\"          Results from last 7 days   Lab Units 12/21/23  2234   PH, ARTERIAL pH units 7.294*   PCO2, ARTERIAL mm Hg 28.3*   PO2 ART mm Hg 97.4   HCO3 ART mmol/L 13.7*     Results from last 7 days   Lab Units 12/21/23  2202   LACTATE mmol/L 5.6*         Results from last 7 days   Lab Units 12/21/23  2202   AMMONIA umol/L 31                       Imaging:  Imaging Results (Most Recent)       Procedure Component Value Units Date/Time    XR Chest 1 View [[id number]] Collected: 12/22/23 0000     Updated: 12/22/23 0006    Narrative:      Portable chest radiograph     HISTORY: Altered mental status     TECHNIQUE: Single AP portable radiograph of the chest     COMPARISON: None       Impression:      FINDINGS AND IMPRESSION:  Lungs are hypoinflated. Mild asymmetric pulmonary pacification is  present within the left lung base representing atelectasis versus  pneumonia in the appropriate clinical context correlation with patient  history is recommended follow-up chest CT if clinically indicated. Given  the somewhat focal appearance, recommend follow-up chest radiograph in 6  to 8 weeks to ensure resolution and exclude any possibility neoplasm.  There appears to be a moderate to large hiatal hernia. Cardiac  silhouette is accentuated by low lung volumes..     This report was finalized on 12/22/2023 12:03 AM by Dr. Soy Yanez M.D on Workstation: [id number]       CT Head Without Contrast [[id number]] Collected: 12/21/23 " 2304     Updated: 12/21/23 2304    Narrative:        Patient: WILY BARAHONA  Time Out: 23:03  Exam(s): CT HEAD Without Contrast     EXAM:    CT Head Without Intravenous Contrast    CLINICAL HISTORY:       Mental status change, unknown cause.    TECHNIQUE:    Axial computed tomography images of the head brain without intravenous   contrast.  CTDI is 55.18 mGy and DLP is 1001.4 mGy-cm.  This CT exam was   performed according to the principle of ALARA (As Low As Reasonably   Achievable) by using one or more of the following dose reduction   techniques: automated exposure control, adjustment of the mA and or kV   according to patient size, and or use of iterative reconstruction   technique.    COMPARISON:    No relevant prior studies available.    FINDINGS:    Brain:  No intracranial hemorrhage, mass-effect or midline shift.  No   abnormal extra axial fluid.  No evidence of acute infarct.  Mild   periventricular white matter hypodensities are most consistent with   chronic microangiopathy.    Ventricles:  Unremarkable.  No ventriculomegaly.    Bones joints:  Unremarkable.  No acute fracture.    Soft tissues:  Unremarkable.    Sinuses:  Unremarkable as visualized.  No acute sinusitis.    Mastoid air cells:  Unremarkable as visualized.  No mastoid effusion.    IMPRESSION:         No acute intracranial finding.      Impression:          Electronically signed by Connie Barnes MD on 12-21-23 at 2303            I personally viewed and interpreted the patient's imaging studies.    Assessment:   DKA  Newly diagnosed diabetes mellitus, hemoglobin A1c 14.7  Severe dehydration as result of the above  Acute renal failure as a result of the above  Hyponatremia with critical sodium  when corrected for the hyperglycemia  Leukocytosis, likely stress related  Hemoconcentration with erythrocytosis  Lactic acidosis    Plan:     Patient has significant hemoconcentration, she had severe dehydration and her sodium is actually very  critically high when corrected for the hyperglycemia and need to be followed very closely  Patient is severely dehydrated and needs to be resuscitated with a fluid bolus per the DKA protocol follow-up by further adjustment probably switching to half-normal saline or D5 water if the sodium level remains critically elevated after correcting  the hyperglycemia.  Patient will be on the insulin drip per the Glucomander/DKA protocol  Renal function is expected to improve with the resuscitation and will follow Closely and the potassium is likely to drop and we will follow and replace accordingly  Leukocytosis likely stress related, the history is not suggestive of any infection and we will hold on any further antibiotics  Expect the hemoglobin to drop, this is anticipated and unlikely to be due to bleeding since patient is severely hemoconcentrated  Will trend the lactic acid level and that is expected to improve as well  Avoid any nephrotoxic agents  Patient already have CT of the head that was reassuring and her encephalopathy is definitely metabolic at this point given the amount of electrolyte and chemical derangements she have on her chemistry profile.    Discussed with the family at the bedside, the patient is currently a full code, will admit to the ICU    Time: Critical care 42 min    Carey Acuna MD  [facility]   12/22/23  00:41 EST    Dictated utilizing Dragon dictation

## 2023-12-22 NOTE — ED PROVIDER NOTES
" EMERGENCY DEPARTMENT ENCOUNTER    Room Number:  18/18  PCP: Provider, No Known  Patient Care Team:  Provider, No Known as PCP - General   Independent Historians: EMS    HPI:  Chief Complaint: Altered mental status    A complete HPI/ROS/PMH/PSH/SH/FH are unobtainable due to: Altered mental status    Chronic or social conditions impacting patient care (Social Determinants of Health): None  (Financial Resource Strain / Food Insecurity / Transportation Needs / Physical Activity / Stress / Social Connections / Intimate Partner Violence / Housing Stability)    Context: Marnie Middleton is a 63 y.o. female who presents to the ED c/o acute altered mental status.  EMS reports patient is visiting her sister from out of town.  Yesterday she seemed \"sick\" and today they noticed she had not left the room.  They found her unresponsive.  EMS was called.  EMS reports blood sugar of 280.  They tried IV access but was not able to obtain it.  They report minimal responsiveness.    Review of prior external notes (non-ED) -and- Review of prior external test results outside of this encounter: Extensive review of the EPIC system as well as [facility] reveals no prior visit notes and no prior diagnostic studies available for review.    Prescription drug monitoring program review:         PAST MEDICAL HISTORY  Active Ambulatory Problems     Diagnosis Date Noted    No Active Ambulatory Problems     Resolved Ambulatory Problems     Diagnosis Date Noted    No Resolved Ambulatory Problems     No Additional Past Medical History         PAST SURGICAL HISTORY  No past surgical history on file.      FAMILY HISTORY  No family history on file.      SOCIAL HISTORY  Social History     Socioeconomic History    Marital status: Single         ALLERGIES  Patient has no allergy information on record.        REVIEW OF SYSTEMS  Review of Systems  Included in HPI  All systems reviewed and negative except for those discussed in HPI.      PHYSICAL EXAM    I " have reviewed the triage vital signs and nursing notes.    ED Triage Vitals   Temp Heart Rate Resp BP SpO2   12/21/23 2159 12/21/23 2151 12/21/23 2157 12/21/23 2157 12/21/23 2151   97.8 °F (36.6 °C) (!) 123 24 114/92 92 %      Temp src Heart Rate Source Patient Position BP Location FiO2 (%)   12/21/23 2159 -- -- -- --   Oral           Physical Exam  GENERAL: Awake, alert, acutely ill-appearing.  Disoriented.  Appears generally weak.  Answers questions.  Follows commands.  Moves all extremities.  SKIN: Warm, dry  HENT: Normocephalic, atraumatic  EYES: no scleral icterus  CV: Tachycardic rhythm, regular rate  RESPIRATORY: normal effort, lungs clear  ABDOMEN: soft, nontender, nondistended  MUSCULOSKELETAL: no deformity  NEURO: alert, moves all extremities, follows commands                                                               LAB RESULTS  Recent Results (from the past 24 hour(s))   ECG 12 Lead Altered Mental Status    Collection Time: 12/21/23  9:57 PM   Result Value Ref Range    QT Interval 321 ms    QTC Interval 458 ms   Comprehensive Metabolic Panel    Collection Time: 12/21/23 10:02 PM    Specimen: Blood   Result Value Ref Range    Glucose 1,004 (C) 65 - 99 mg/dL    BUN 63 (H) 8 - 23 mg/dL    Creatinine 2.56 (H) 0.57 - 1.00 mg/dL    Sodium 151 (H) 136 - 145 mmol/L    Potassium 5.0 3.5 - 5.2 mmol/L    Chloride 98 98 - 107 mmol/L    CO2 16.0 (L) 22.0 - 29.0 mmol/L    Calcium 10.8 (H) 8.6 - 10.5 mg/dL    Total Protein 9.0 (H) 6.0 - 8.5 g/dL    Albumin 5.3 (H) 3.5 - 5.2 g/dL    ALT (SGPT) 33 1 - 33 U/L    AST (SGOT) 18 1 - 32 U/L    Alkaline Phosphatase 239 (H) 39 - 117 U/L    Total Bilirubin 0.5 0.0 - 1.2 mg/dL    Globulin 3.7 gm/dL    A/G Ratio 1.4 g/dL    BUN/Creatinine Ratio 24.6 7.0 - 25.0    Anion Gap 37.0 (H) 5.0 - 15.0 mmol/L    eGFR 20.4 (L) >60.0 mL/min/1.73   High Sensitivity Troponin T    Collection Time: 12/21/23 10:02 PM    Specimen: Blood   Result Value Ref Range    HS Troponin T 13 <14 ng/L    Ethanol    Collection Time: 12/21/23 10:02 PM    Specimen: Blood   Result Value Ref Range    Ethanol <10 0 - 10 mg/dL    Ethanol % <0.010 %   Ammonia    Collection Time: 12/21/23 10:02 PM    Specimen: Blood   Result Value Ref Range    Ammonia 31 11 - 51 umol/L   Lactic Acid, Plasma    Collection Time: 12/21/23 10:02 PM    Specimen: Blood   Result Value Ref Range    Lactate 5.6 (C) 0.5 - 2.0 mmol/L   CBC Auto Differential    Collection Time: 12/21/23 10:02 PM    Specimen: Blood   Result Value Ref Range    WBC 15.91 (H) 3.40 - 10.80 10*3/mm3    RBC 5.91 (H) 3.77 - 5.28 10*6/mm3    Hemoglobin 18.3 (H) 12.0 - 15.9 g/dL    Hematocrit 59.0 (H) 34.0 - 46.6 %    MCV 99.8 (H) 79.0 - 97.0 fL    MCH 31.0 26.6 - 33.0 pg    MCHC 31.0 (L) 31.5 - 35.7 g/dL    RDW 12.8 12.3 - 15.4 %    RDW-SD 47.1 37.0 - 54.0 fl    MPV 13.2 (H) 6.0 - 12.0 fL    Platelets 345 140 - 450 10*3/mm3   Beta Hydroxybutyrate Quantitative    Collection Time: 12/21/23 10:02 PM    Specimen: Blood   Result Value Ref Range    Beta-Hydroxybutyrate Quant 10.897 (H) 0.020 - 0.270 mmol/L   Manual Differential    Collection Time: 12/21/23 10:02 PM    Specimen: Blood   Result Value Ref Range    Neutrophil % 87.6 (H) 42.7 - 76.0 %    Lymphocyte % 9.3 (L) 19.6 - 45.3 %    Monocyte % 3.1 (L) 5.0 - 12.0 %    Neutrophils Absolute 13.94 (H) 1.70 - 7.00 10*3/mm3    Lymphocytes Absolute 1.48 0.70 - 3.10 10*3/mm3    Monocytes Absolute 0.49 0.10 - 0.90 10*3/mm3    RBC Morphology Normal Normal    WBC Morphology Normal Normal    Platelet Morphology Normal Normal   Blood Gas, Arterial -    Collection Time: 12/21/23 10:34 PM    Specimen: Arterial Blood   Result Value Ref Range    Site Right Radial     Rehan's Test Positive     pH, Arterial 7.294 (C) 7.350 - 7.450 pH units    pCO2, Arterial 28.3 (L) 35.0 - 45.0 mm Hg    pO2, Arterial 97.4 80.0 - 100.0 mm Hg    HCO3, Arterial 13.7 (L) 22.0 - 28.0 mmol/L    Base Excess, Arterial -10.9 (L) 0.0 - 2.0 mmol/L    O2 Saturation, Arterial  96.9 92.0 - 98.5 %    CO2 Content 14.6 (L) 23 - 27 mmol/L    Barometric Pressure for Blood Gas 758.1000 mmHg    Modality Room Air     Rate 16 Breaths/minute    Notified Who      Read Back      Notified Time      Hemodilution No     Device Comment sat 97%    Urine Drug Screen - Urine, Clean Catch    Collection Time: 12/21/23 10:52 PM    Specimen: Urine, Clean Catch   Result Value Ref Range    Amphet/Methamphet, Screen Negative Negative    Barbiturates Screen, Urine Negative Negative    Benzodiazepine Screen, Urine Negative Negative    Cocaine Screen, Urine Negative Negative    Opiate Screen Negative Negative    THC, Screen, Urine Negative Negative    Methadone Screen, Urine Negative Negative    Oxycodone Screen, Urine Negative Negative    Fentanyl, Urine Negative Negative       I ordered the above labs and independently reviewed the results.        RADIOLOGY  CT Head Without Contrast    Result Date: 12/21/2023  Patient: WILY BARAHONA  Time Out: 23:03 Exam(s): CT HEAD Without Contrast EXAM:   CT Head Without Intravenous Contrast CLINICAL HISTORY:      Mental status change, unknown cause. TECHNIQUE:   Axial computed tomography images of the head brain without intravenous contrast.  CTDI is 55.18 mGy and DLP is 1001.4 mGy-cm.  This CT exam was performed according to the principle of ALARA (As Low As Reasonably Achievable) by using one or more of the following dose reduction techniques: automated exposure control, adjustment of the mA and or kV according to patient size, and or use of iterative reconstruction technique. COMPARISON:   No relevant prior studies available. FINDINGS:   Brain:  No intracranial hemorrhage, mass-effect or midline shift.  No abnormal extra axial fluid.  No evidence of acute infarct.  Mild periventricular white matter hypodensities are most consistent with chronic microangiopathy.   Ventricles:  Unremarkable.  No ventriculomegaly.   Bones joints:  Unremarkable.  No acute fracture.   Soft  tissues:  Unremarkable.   Sinuses:  Unremarkable as visualized.  No acute sinusitis.   Mastoid air cells:  Unremarkable as visualized.  No mastoid effusion. IMPRESSION:       No acute intracranial finding.     Electronically signed by Connie Barnes MD on 12-21-23 at 2303     I ordered the above noted radiological studies. Reviewed by me and discussed with radiologist.  See dictation for official radiology interpretation.      PROCEDURES    Procedures      MEDICATIONS GIVEN IN ER    Medications   sodium chloride 0.9 % flush 10 mL (has no administration in time range)   sepsis fluid NS 0.9 % bolus 1,938 mL (has no administration in time range)   piperacillin-tazobactam (ZOSYN) 3.375 g in iso-osmotic dextrose 50 ml (premix) (has no administration in time range)   vancomycin 1250 mg/250 mL 0.9% NS IVPB ([facility]) (has no administration in time range)   sodium chloride 0.9 % flush 10 mL (has no administration in time range)   sodium chloride 0.9 % flush 10 mL (has no administration in time range)   sodium chloride 0.9 % infusion 40 mL (has no administration in time range)   dextrose (GLUTOSE) oral gel 15 g (has no administration in time range)   dextrose (D50W) (25 g/50 mL) IV injection 10-50 mL (has no administration in time range)   glucagon (GLUCAGEN) injection 1 mg (has no administration in time range)   sodium chloride 0.9 % bolus (has no administration in time range)   sodium chloride 0.9 % infusion (has no administration in time range)   sodium chloride 0.9 % with KCl 20 mEq/L infusion (has no administration in time range)   sodium chloride 0.9 % with KCl 40 mEq/L infusion (has no administration in time range)   dextrose 5 % and sodium chloride 0.9 % infusion (has no administration in time range)   dextrose 5 % and sodium chloride 0.9 % with KCl 20 mEq/L infusion (has no administration in time range)   dextrose 5 % and sodium chloride 0.9 % with KCl 40 mEq/L infusion (has no administration in time range)   sodium  chloride 0.45 % infusion (has no administration in time range)   sodium chloride 0.45 % with KCl 20 mEq/L infusion (has no administration in time range)   sodium chloride 0.45 % 1,000 mL with potassium chloride 40 mEq infusion (has no administration in time range)   dextrose 5 % and sodium chloride 0.45 % infusion (has no administration in time range)   dextrose 5 % and sodium chloride 0.45 % with KCl 20 mEq/L infusion (has no administration in time range)   dextrose 5 % and sodium chloride 0.45 % with KCl 40 mEq/L infusion (has no administration in time range)   insulin regular 1 unit/mL in 0.9% sodium chloride (Glucommander) (has no administration in time range)   Potassium Replacement - Follow Nurse / BPA Driven Protocol (has no administration in time range)   Magnesium Standard Dose Replacement - Follow Nurse / BPA Driven Protocol (has no administration in time range)   Phosphorus Replacement - Follow Nurse / BPA Driven Protocol (has no administration in time range)   Calcium Replacement - Follow Nurse / BPA Driven Protocol (has no administration in time range)   sepsis fluid NS 0.9 % bolus 1,938 mL (has no administration in time range)   sodium chloride 0.9 % bolus 1,000 mL (1,000 mL Intravenous New Bag 12/21/23 0728)         ORDERS PLACED DURING THIS VISIT:  Orders Placed This Encounter   Procedures    Blood Culture - Blood,    Blood Culture - Blood,    XR Chest 1 View    CT Head Without Contrast    Comprehensive Metabolic Panel    High Sensitivity Troponin T    Blood Gas, Arterial -    Ethanol    Urine Drug Screen - Urine, Clean Catch    Ammonia    Lactic Acid, Plasma    CBC Auto Differential    Beta Hydroxybutyrate Quantitative    STAT Lactic Acid, Reflex    High Sensitivity Troponin T 2Hr    Blood Gas, Arterial -    Manual Differential    Phosphorus    Magnesium    Osmolality, Serum    Hemoglobin A1c    Basic Metabolic Panel    Magnesium    Phosphorus    NPO Diet NPO Type: Strict NPO    Monitor Blood  Pressure    Vital Signs    Strict Intake & Output    Daily Weights    Continuous Pulse Oximetry    Saline Lock & Maintain IV Access    Corrected Serum Sodium = Measured Sodium + [1.6 x ((Glucose - 100)/100)]    Do NOT Discontinue Insulin Infusion Until 2 Hours After First Dose of Basal SQ Insulin    Prior to Initiating Glucommander™, Ensure All Prior Insulin Orders Are Discontinued    Do Not Start Insulin Infusion if Potassium < 3.3    Use a Dedicated Line for Insulin Infusion (If Possible).  May Use a Carrier Fluid of NS at KVO Rate if Insulin Rate is Insufficient to Maintain IV Patency.  Prime IV Line With Insulin Infusion    Glucommander Must Be Discontinued if Insulin Infusion is Discontinued.  If Insulin Infusion is Restarted, Previous Glucommander Settings Must Be Discontinued and Re-Entered From New Order    Once DKA Meets Resolution Criteria - Call Provider for Transition Orders From IV to SQ Insulin    Utilize the Start Meal Feature / Meal Bolus Feature in Glucommander if Patient Starts a Diet or Bolus Tube Feedings    Notify Provider - Insulin Infusion    RN to Release PRN POC Glucose Orders Per Glucommander    RN to Order STAT Glucose For Any POC Glucose <10 or >600    If Insulin Infusion is Paused - Follow Glucommander Instructions    DKA Patients With Phosphorus Level 1 or Higher Do NOT Require Replacement (While Insulin Infusing)    Inpatient Diabetes Educator Consult    Pulmonology (on-call MD unless specified)    Oxygen Therapy- Nasal Cannula; Titrate 1-6 LPM Per SpO2; 90 - 95%    POC Glucose PRN    ECG 12 Lead Altered Mental Status    Insert Peripheral IV    Insert Peripheral IV x2    Inpatient Admission    CBC & Differential    Ketone Bodies, Serum (Not performed at [place])         PROGRESS, DATA ANALYSIS, CONSULTS, AND MEDICAL DECISION MAKING    All labs have been independently interpreted by me.  All radiology studies have been reviewed by me and discussed with radiologist dictating the report.    EKG's independently viewed and interpreted by me.  Discussion below represents my analysis of pertinent findings related to patient's condition, differential diagnosis, treatment plan and final disposition.    Differential diagnosis includes but is not limited to sepsis, renal failure, electrolyte disturbance, DKA, intracranial hemorrhage, pneumonia.    Clinical Scores:              ED Course as of 12/21/23 2356   u Dec 21, 2023   2204 EKG          EKG time: 2157  Rhythm/Rate: Sinus tachycardia, rate 122  P waves and CO: Normal P, normal CO  QRS, axis: Narrow QRS, normal axis  ST and T waves: No acute    Independently Interpreted by me  No prior EKG available for comparison   [TR]   2224 CT Head Without Contrast  My independent interpretation of the CT of the head is no acute hemorrhage [TR]   2237 Lactate(!!): 5.6 [TR]   2241 pH, Arterial(!!): 7.294 [TR]   2241 Base Excess(!): -10.9 [TR]   2250 Her blood glucose point-of-care here read critical high.  Per EMS her only past medical history is esophagitis. [TR]   2254 WBC(!): 15.91 [TR]   2254 Hemoglobin(!): 18.3 [TR]   2343 Glucose(!!): 1,004 [TR]   2344 The patient is now more awake and alert.  Her glucose is 1000.  She has no history of diabetes.  Plan to initiate DKA protocol and admission to the ICU. [TR]   2348 I reviewed workup and findings with the patient and friends at the bedside.  Answered all questions.  Plan admission.  They are agreeable. [TR]   2355 Discussing with Dr. Acuna with pulmonary ICU.  He agrees to admit. [TR]      ED Course User Index  [TR] Juan Miguel Sapp MD         Critical care provider statement:     Critical care time (minutes): 30-74.    Critical care time was exclusive of:  Separately billable procedures and treating other patients    Critical care was necessary to treat or prevent imminent or life-threatening deterioration of the following conditions: CNS failure    Critical care was time spent personally by me on the following  activities:  Development of treatment plan with patient or surrogate, discussions with consultants, evaluation of patient's response to treatment, examination of patient, obtaining history from patient or surrogate, ordering and performing treatments and interventions, ordering and review of laboratory studies, ordering and review of radiographic studies, pulse oximetry, re-evaluation of patient's condition and review of old charts      I interpreted the cardiac monitor rhythm and my independent interpretation is: Sinus tachycardia, rate of 123. The patient presents with altered mental status and the cardiac monitor was ordered to monitor for arrhythmias and acute change in clinical status.    PPE: The patient wore a mask and I wore an N95 mask throughout the entire patient encounter.       AS OF 23:56 EST VITALS:    BP - 113/77  HR - (!) 123  TEMP - 97.8 °F (36.6 °C) (Oral)  O2 SATS - 95%        DIAGNOSIS  Final diagnoses:   Sepsis with encephalopathy without septic shock, due to unspecified organism   Diabetic ketoacidosis with coma associated with type 2 diabetes mellitus         DISPOSITION  ED Disposition       ED Disposition   Decision to Admit    Condition   --    Comment   Level of Care: Critical Care [6]   Diagnosis: DKA (diabetic ketoacidosis) [225561]   Admitting Physician: ERIS SHEPARD [[id number]]   Attending Physician: ERIS SHEPARD [[id number]]   Certification: I Certify That Inpatient Hospital Services Are Medically Necessary For Greater Than 2 Midnights                    Note Disclaimer: At [facility], we believe that sharing information builds trust and better relationships. You are receiving this note because you recently visited [facility]. It is possible you will see health information before a provider has talked with you about it. This kind of information can be easy to misunderstand. To help you fully understand what it means for your health, we urge you to discuss this note with your  provider.         Juan Miguel Sapp MD  12/21/23 0293

## 2023-12-23 LAB
ALBUMIN SERPL-MCNC: 2.9 G/DL (ref 3.5–5.2)
ALBUMIN SERPL-MCNC: 3.1 G/DL (ref 3.5–5.2)
ALBUMIN/GLOB SERPL: 1.4 G/DL
ALBUMIN/GLOB SERPL: 1.4 G/DL
ALP SERPL-CCNC: 111 U/L (ref 39–117)
ALP SERPL-CCNC: 98 U/L (ref 39–117)
ALT SERPL W P-5'-P-CCNC: 23 U/L (ref 1–33)
ALT SERPL W P-5'-P-CCNC: 23 U/L (ref 1–33)
ANION GAP SERPL CALCULATED.3IONS-SCNC: 10 MMOL/L (ref 5–15)
ANION GAP SERPL CALCULATED.3IONS-SCNC: 13 MMOL/L (ref 5–15)
ANION GAP SERPL CALCULATED.3IONS-SCNC: 7 MMOL/L (ref 5–15)
ANION GAP SERPL CALCULATED.3IONS-SCNC: 7.6 MMOL/L (ref 5–15)
ANION GAP SERPL CALCULATED.3IONS-SCNC: 8 MMOL/L (ref 5–15)
ANION GAP SERPL CALCULATED.3IONS-SCNC: 9 MMOL/L (ref 5–15)
AST SERPL-CCNC: 29 U/L (ref 1–32)
AST SERPL-CCNC: 30 U/L (ref 1–32)
BASOPHILS # BLD AUTO: 0.01 10*3/MM3 (ref 0–0.2)
BASOPHILS # BLD AUTO: 0.02 10*3/MM3 (ref 0–0.2)
BASOPHILS NFR BLD AUTO: 0.1 % (ref 0–1.5)
BASOPHILS NFR BLD AUTO: 0.1 % (ref 0–1.5)
BILIRUB SERPL-MCNC: 0.4 MG/DL (ref 0–1.2)
BILIRUB SERPL-MCNC: 0.5 MG/DL (ref 0–1.2)
BUN SERPL-MCNC: 12 MG/DL (ref 8–23)
BUN SERPL-MCNC: 13 MG/DL (ref 8–23)
BUN SERPL-MCNC: 15 MG/DL (ref 8–23)
BUN SERPL-MCNC: 19 MG/DL (ref 8–23)
BUN SERPL-MCNC: 23 MG/DL (ref 8–23)
BUN SERPL-MCNC: 9 MG/DL (ref 8–23)
BUN/CREAT SERPL: 13 (ref 7–25)
BUN/CREAT SERPL: 13 (ref 7–25)
BUN/CREAT SERPL: 13.6 (ref 7–25)
BUN/CREAT SERPL: 15.6 (ref 7–25)
BUN/CREAT SERPL: 16.3 (ref 7–25)
BUN/CREAT SERPL: 17.6 (ref 7–25)
BUN/CREAT SERPL: 18.6 (ref 7–25)
BUN/CREAT SERPL: 19.5 (ref 7–25)
CALCIUM SPEC-SCNC: 5.9 MG/DL (ref 8.6–10.5)
CALCIUM SPEC-SCNC: 6.9 MG/DL (ref 8.6–10.5)
CALCIUM SPEC-SCNC: 7 MG/DL (ref 8.6–10.5)
CALCIUM SPEC-SCNC: 7.5 MG/DL (ref 8.6–10.5)
CALCIUM SPEC-SCNC: 7.6 MG/DL (ref 8.6–10.5)
CALCIUM SPEC-SCNC: 7.6 MG/DL (ref 8.6–10.5)
CHLORIDE SERPL-SCNC: 119 MMOL/L (ref 98–107)
CHLORIDE SERPL-SCNC: 124 MMOL/L (ref 98–107)
CHLORIDE SERPL-SCNC: 125 MMOL/L (ref 98–107)
CHLORIDE SERPL-SCNC: 126 MMOL/L (ref 98–107)
CHLORIDE SERPL-SCNC: 127 MMOL/L (ref 98–107)
CHLORIDE SERPL-SCNC: 127 MMOL/L (ref 98–107)
CO2 SERPL-SCNC: 17 MMOL/L (ref 22–29)
CO2 SERPL-SCNC: 19 MMOL/L (ref 22–29)
CO2 SERPL-SCNC: 19 MMOL/L (ref 22–29)
CO2 SERPL-SCNC: 19.4 MMOL/L (ref 22–29)
CO2 SERPL-SCNC: 20 MMOL/L (ref 22–29)
CREAT SERPL-MCNC: 0.66 MG/DL (ref 0.57–1)
CREAT SERPL-MCNC: 0.69 MG/DL (ref 0.57–1)
CREAT SERPL-MCNC: 0.69 MG/DL (ref 0.57–1)
CREAT SERPL-MCNC: 0.77 MG/DL (ref 0.57–1)
CREAT SERPL-MCNC: 0.8 MG/DL (ref 0.57–1)
CREAT SERPL-MCNC: 0.85 MG/DL (ref 0.57–1)
CREAT SERPL-MCNC: 1.02 MG/DL (ref 0.57–1)
CREAT SERPL-MCNC: 1.18 MG/DL (ref 0.57–1)
D-LACTATE SERPL-SCNC: 1.6 MMOL/L (ref 0.5–2)
DEPRECATED RDW RBC AUTO: 41.2 FL (ref 37–54)
DEPRECATED RDW RBC AUTO: 43.6 FL (ref 37–54)
EGFRCR SERPLBLD CKD-EPI 2021: 51.7 ML/MIN/1.73
EGFRCR SERPLBLD CKD-EPI 2021: 61.6 ML/MIN/1.73
EGFRCR SERPLBLD CKD-EPI 2021: 76.6 ML/MIN/1.73
EGFRCR SERPLBLD CKD-EPI 2021: 82.4 ML/MIN/1.73
EGFRCR SERPLBLD CKD-EPI 2021: 86.3 ML/MIN/1.73
EGFRCR SERPLBLD CKD-EPI 2021: 97.1 ML/MIN/1.73
EGFRCR SERPLBLD CKD-EPI 2021: 97.1 ML/MIN/1.73
EGFRCR SERPLBLD CKD-EPI 2021: 98.1 ML/MIN/1.73
EOSINOPHIL # BLD AUTO: 0 10*3/MM3 (ref 0–0.4)
EOSINOPHIL # BLD AUTO: 0 10*3/MM3 (ref 0–0.4)
EOSINOPHIL NFR BLD AUTO: 0 % (ref 0.3–6.2)
EOSINOPHIL NFR BLD AUTO: 0 % (ref 0.3–6.2)
ERYTHROCYTE [DISTWIDTH] IN BLOOD BY AUTOMATED COUNT: 12.4 % (ref 12.3–15.4)
ERYTHROCYTE [DISTWIDTH] IN BLOOD BY AUTOMATED COUNT: 12.5 % (ref 12.3–15.4)
GLOBULIN UR ELPH-MCNC: 2.1 GM/DL
GLOBULIN UR ELPH-MCNC: 2.2 GM/DL
GLUCOSE BLDC GLUCOMTR-MCNC: 111 MG/DL (ref 70–130)
GLUCOSE BLDC GLUCOMTR-MCNC: 120 MG/DL (ref 70–130)
GLUCOSE BLDC GLUCOMTR-MCNC: 121 MG/DL (ref 70–130)
GLUCOSE BLDC GLUCOMTR-MCNC: 121 MG/DL (ref 70–130)
GLUCOSE BLDC GLUCOMTR-MCNC: 129 MG/DL (ref 70–130)
GLUCOSE BLDC GLUCOMTR-MCNC: 134 MG/DL (ref 70–130)
GLUCOSE BLDC GLUCOMTR-MCNC: 138 MG/DL (ref 70–130)
GLUCOSE BLDC GLUCOMTR-MCNC: 144 MG/DL (ref 70–130)
GLUCOSE BLDC GLUCOMTR-MCNC: 144 MG/DL (ref 70–130)
GLUCOSE BLDC GLUCOMTR-MCNC: 147 MG/DL (ref 70–130)
GLUCOSE BLDC GLUCOMTR-MCNC: 161 MG/DL (ref 70–130)
GLUCOSE BLDC GLUCOMTR-MCNC: 205 MG/DL (ref 70–130)
GLUCOSE BLDC GLUCOMTR-MCNC: 214 MG/DL (ref 70–130)
GLUCOSE BLDC GLUCOMTR-MCNC: 216 MG/DL (ref 70–130)
GLUCOSE BLDC GLUCOMTR-MCNC: 59 MG/DL (ref 70–130)
GLUCOSE BLDC GLUCOMTR-MCNC: 62 MG/DL (ref 70–130)
GLUCOSE BLDC GLUCOMTR-MCNC: 75 MG/DL (ref 70–130)
GLUCOSE BLDC GLUCOMTR-MCNC: 90 MG/DL (ref 70–130)
GLUCOSE BLDC GLUCOMTR-MCNC: 99 MG/DL (ref 70–130)
GLUCOSE SERPL-MCNC: 132 MG/DL (ref 65–99)
GLUCOSE SERPL-MCNC: 135 MG/DL (ref 65–99)
GLUCOSE SERPL-MCNC: 139 MG/DL (ref 65–99)
GLUCOSE SERPL-MCNC: 148 MG/DL (ref 65–99)
GLUCOSE SERPL-MCNC: 194 MG/DL (ref 65–99)
GLUCOSE SERPL-MCNC: 715 MG/DL (ref 65–99)
HCT VFR BLD AUTO: 34.9 % (ref 34–46.6)
HCT VFR BLD AUTO: 39.3 % (ref 34–46.6)
HGB BLD-MCNC: 11 G/DL (ref 12–15.9)
HGB BLD-MCNC: 12.8 G/DL (ref 12–15.9)
IMM GRANULOCYTES # BLD AUTO: 0.08 10*3/MM3 (ref 0–0.05)
IMM GRANULOCYTES NFR BLD AUTO: 0.6 % (ref 0–0.5)
LYMPHOCYTES # BLD AUTO: 2.01 10*3/MM3 (ref 0.7–3.1)
LYMPHOCYTES # BLD AUTO: 2.29 10*3/MM3 (ref 0.7–3.1)
LYMPHOCYTES NFR BLD AUTO: 14.2 % (ref 19.6–45.3)
LYMPHOCYTES NFR BLD AUTO: 14.8 % (ref 19.6–45.3)
MAGNESIUM SERPL-MCNC: 1.7 MG/DL (ref 1.6–2.4)
MAGNESIUM SERPL-MCNC: 1.7 MG/DL (ref 1.6–2.4)
MAGNESIUM SERPL-MCNC: 1.8 MG/DL (ref 1.6–2.4)
MAGNESIUM SERPL-MCNC: 1.9 MG/DL (ref 1.6–2.4)
MAGNESIUM SERPL-MCNC: 2 MG/DL (ref 1.6–2.4)
MAGNESIUM SERPL-MCNC: 2.1 MG/DL (ref 1.6–2.4)
MAGNESIUM SERPL-MCNC: 2.2 MG/DL (ref 1.6–2.4)
MCH RBC QN AUTO: 29.3 PG (ref 26.6–33)
MCH RBC QN AUTO: 30.3 PG (ref 26.6–33)
MCHC RBC AUTO-ENTMCNC: 31.5 G/DL (ref 31.5–35.7)
MCHC RBC AUTO-ENTMCNC: 32.6 G/DL (ref 31.5–35.7)
MCV RBC AUTO: 89.9 FL (ref 79–97)
MCV RBC AUTO: 96.1 FL (ref 79–97)
MONOCYTES # BLD AUTO: 0.66 10*3/MM3 (ref 0.1–0.9)
MONOCYTES # BLD AUTO: 0.85 10*3/MM3 (ref 0.1–0.9)
MONOCYTES NFR BLD AUTO: 4.9 % (ref 5–12)
MONOCYTES NFR BLD AUTO: 5.3 % (ref 5–12)
NEUTROPHILS NFR BLD AUTO: 10.78 10*3/MM3 (ref 1.7–7)
NEUTROPHILS NFR BLD AUTO: 12.92 10*3/MM3 (ref 1.7–7)
NEUTROPHILS NFR BLD AUTO: 79.6 % (ref 42.7–76)
NEUTROPHILS NFR BLD AUTO: 79.7 % (ref 42.7–76)
NRBC BLD AUTO-RTO: 0.1 /100 WBC (ref 0–0.2)
PHOSPHATE SERPL-MCNC: 0.6 MG/DL (ref 2.5–4.5)
PHOSPHATE SERPL-MCNC: 0.8 MG/DL (ref 2.5–4.5)
PHOSPHATE SERPL-MCNC: 1 MG/DL (ref 2.5–4.5)
PHOSPHATE SERPL-MCNC: 1.2 MG/DL (ref 2.5–4.5)
PHOSPHATE SERPL-MCNC: 1.3 MG/DL (ref 2.5–4.5)
PHOSPHATE SERPL-MCNC: 1.5 MG/DL (ref 2.5–4.5)
PHOSPHATE SERPL-MCNC: 1.6 MG/DL (ref 2.5–4.5)
PLATELET # BLD AUTO: 101 10*3/MM3 (ref 140–450)
PLATELET # BLD AUTO: 86 10*3/MM3 (ref 140–450)
PMV BLD AUTO: 11.8 FL (ref 6–12)
PMV BLD AUTO: 12 FL (ref 6–12)
POTASSIUM SERPL-SCNC: 3.2 MMOL/L (ref 3.5–5.2)
POTASSIUM SERPL-SCNC: 3.4 MMOL/L (ref 3.5–5.2)
POTASSIUM SERPL-SCNC: 3.5 MMOL/L (ref 3.5–5.2)
POTASSIUM SERPL-SCNC: 3.6 MMOL/L (ref 3.5–5.2)
POTASSIUM SERPL-SCNC: 3.6 MMOL/L (ref 3.5–5.2)
POTASSIUM SERPL-SCNC: 3.7 MMOL/L (ref 3.5–5.2)
POTASSIUM SERPL-SCNC: 3.8 MMOL/L (ref 3.5–5.2)
POTASSIUM SERPL-SCNC: 3.8 MMOL/L (ref 3.5–5.2)
PROCALCITONIN SERPL-MCNC: 0.37 NG/ML (ref 0–0.25)
PROT SERPL-MCNC: 5 G/DL (ref 6–8.5)
PROT SERPL-MCNC: 5.3 G/DL (ref 6–8.5)
RBC # BLD AUTO: 3.63 10*6/MM3 (ref 3.77–5.28)
RBC # BLD AUTO: 4.37 10*6/MM3 (ref 3.77–5.28)
SODIUM SERPL-SCNC: 143 MMOL/L (ref 136–145)
SODIUM SERPL-SCNC: 152 MMOL/L (ref 136–145)
SODIUM SERPL-SCNC: 153 MMOL/L (ref 136–145)
SODIUM SERPL-SCNC: 154 MMOL/L (ref 136–145)
SODIUM SERPL-SCNC: 156 MMOL/L (ref 136–145)
SODIUM SERPL-SCNC: 159 MMOL/L (ref 136–145)
WBC NRBC COR # BLD AUTO: 13.55 10*3/MM3 (ref 3.4–10.8)
WBC NRBC COR # BLD AUTO: 16.18 10*3/MM3 (ref 3.4–10.8)

## 2023-12-23 PROCEDURE — 84145 PROCALCITONIN (PCT): CPT | Performed by: HOSPITALIST

## 2023-12-23 PROCEDURE — 84100 ASSAY OF PHOSPHORUS: CPT | Performed by: INTERNAL MEDICINE

## 2023-12-23 PROCEDURE — 84100 ASSAY OF PHOSPHORUS: CPT | Performed by: HOSPITALIST

## 2023-12-23 PROCEDURE — 36415 COLL VENOUS BLD VENIPUNCTURE: CPT | Performed by: INTERNAL MEDICINE

## 2023-12-23 PROCEDURE — 85025 COMPLETE CBC W/AUTO DIFF WBC: CPT | Performed by: HOSPITALIST

## 2023-12-23 PROCEDURE — 25010000002 POTASSIUM CHLORIDE 10 MEQ/100ML SOLUTION: Performed by: HOSPITALIST

## 2023-12-23 PROCEDURE — 63710000001 INSULIN GLARGINE PER 5 UNITS: Performed by: INTERNAL MEDICINE

## 2023-12-23 PROCEDURE — 82948 REAGENT STRIP/BLOOD GLUCOSE: CPT

## 2023-12-23 PROCEDURE — 25010000002 ONDANSETRON PER 1 MG

## 2023-12-23 PROCEDURE — 25810000003 SODIUM CHLORIDE 0.9 % SOLUTION: Performed by: HOSPITALIST

## 2023-12-23 PROCEDURE — 80053 COMPREHEN METABOLIC PANEL: CPT | Performed by: INTERNAL MEDICINE

## 2023-12-23 PROCEDURE — 83605 ASSAY OF LACTIC ACID: CPT | Performed by: INTERNAL MEDICINE

## 2023-12-23 PROCEDURE — 25010000002 PROCHLORPERAZINE 10 MG/2ML SOLUTION: Performed by: HOSPITALIST

## 2023-12-23 PROCEDURE — 25010000002 POTASSIUM CHLORIDE 10 MEQ/100ML SOLUTION

## 2023-12-23 PROCEDURE — 25810000003 SODIUM CHLORIDE 0.9 % SOLUTION: Performed by: INTERNAL MEDICINE

## 2023-12-23 PROCEDURE — 63710000001 INSULIN LISPRO (HUMAN) PER 5 UNITS: Performed by: INTERNAL MEDICINE

## 2023-12-23 PROCEDURE — 83735 ASSAY OF MAGNESIUM: CPT | Performed by: INTERNAL MEDICINE

## 2023-12-23 PROCEDURE — 83735 ASSAY OF MAGNESIUM: CPT | Performed by: HOSPITALIST

## 2023-12-23 RX ORDER — FENTANYL/ROPIVACAINE/NS/PF 2-625MCG/1
15 PLASTIC BAG, INJECTION (ML) EPIDURAL
Qty: 500 ML | Refills: 0 | Status: COMPLETED | OUTPATIENT
Start: 2023-12-23 | End: 2023-12-23

## 2023-12-23 RX ORDER — DEXTROSE MONOHYDRATE 25 G/50ML
25 INJECTION, SOLUTION INTRAVENOUS
Status: DISCONTINUED | OUTPATIENT
Start: 2023-12-23 | End: 2023-12-29

## 2023-12-23 RX ORDER — POTASSIUM CHLORIDE 7.45 MG/ML
10 INJECTION INTRAVENOUS
Status: COMPLETED | OUTPATIENT
Start: 2023-12-23 | End: 2023-12-23

## 2023-12-23 RX ORDER — NICOTINE POLACRILEX 4 MG
15 LOZENGE BUCCAL
Status: DISCONTINUED | OUTPATIENT
Start: 2023-12-23 | End: 2023-12-29

## 2023-12-23 RX ORDER — ONDANSETRON 2 MG/ML
4 INJECTION INTRAMUSCULAR; INTRAVENOUS EVERY 6 HOURS PRN
Status: DISCONTINUED | OUTPATIENT
Start: 2023-12-23 | End: 2023-12-29 | Stop reason: HOSPADM

## 2023-12-23 RX ORDER — DEXTROSE AND SODIUM CHLORIDE 5; .45 G/100ML; G/100ML
150 INJECTION, SOLUTION INTRAVENOUS CONTINUOUS
Status: DISCONTINUED | OUTPATIENT
Start: 2023-12-23 | End: 2023-12-25

## 2023-12-23 RX ORDER — IBUPROFEN 600 MG/1
1 TABLET ORAL
Status: DISCONTINUED | OUTPATIENT
Start: 2023-12-23 | End: 2023-12-29

## 2023-12-23 RX ORDER — PROCHLORPERAZINE EDISYLATE 5 MG/ML
5 INJECTION INTRAMUSCULAR; INTRAVENOUS EVERY 4 HOURS PRN
Status: DISCONTINUED | OUTPATIENT
Start: 2023-12-23 | End: 2023-12-29 | Stop reason: HOSPADM

## 2023-12-23 RX ORDER — INSULIN LISPRO 100 [IU]/ML
4-24 INJECTION, SOLUTION INTRAVENOUS; SUBCUTANEOUS
Status: DISCONTINUED | OUTPATIENT
Start: 2023-12-23 | End: 2023-12-23

## 2023-12-23 RX ORDER — SODIUM PHOSPHATE IN 0.9 % NACL 15MMOL/100
15 PLASTIC BAG, INJECTION (ML) INTRAVENOUS
Status: COMPLETED | OUTPATIENT
Start: 2023-12-23 | End: 2023-12-24

## 2023-12-23 RX ORDER — INSULIN LISPRO 100 [IU]/ML
4-24 INJECTION, SOLUTION INTRAVENOUS; SUBCUTANEOUS
Status: DISCONTINUED | OUTPATIENT
Start: 2023-12-24 | End: 2023-12-29

## 2023-12-23 RX ORDER — INSULIN LISPRO 100 [IU]/ML
4-24 INJECTION, SOLUTION INTRAVENOUS; SUBCUTANEOUS
Status: DISCONTINUED | OUTPATIENT
Start: 2023-12-24 | End: 2023-12-23

## 2023-12-23 RX ADMIN — ONDANSETRON HYDROCHLORIDE 4 MG: 2 INJECTION, SOLUTION INTRAMUSCULAR; INTRAVENOUS at 01:19

## 2023-12-23 RX ADMIN — INSULIN GLARGINE 20 UNITS: 100 INJECTION, SOLUTION SUBCUTANEOUS at 17:38

## 2023-12-23 RX ADMIN — INSULIN HUMAN 14.7 UNITS/HR: 1 INJECTION, SOLUTION INTRAVENOUS at 07:06

## 2023-12-23 RX ADMIN — ONDANSETRON HYDROCHLORIDE 4 MG: 2 INJECTION, SOLUTION INTRAMUSCULAR; INTRAVENOUS at 07:57

## 2023-12-23 RX ADMIN — POTASSIUM PHOSPHATE, MONOBASIC POTASSIUM PHOSPHATE, DIBASIC 15 MMOL: 224; 236 INJECTION, SOLUTION, CONCENTRATE INTRAVENOUS at 10:11

## 2023-12-23 RX ADMIN — DEXTROSE AND SODIUM CHLORIDE 150 ML/HR: 5; 450 INJECTION, SOLUTION INTRAVENOUS at 04:03

## 2023-12-23 RX ADMIN — DEXTROSE AND SODIUM CHLORIDE 150 ML/HR: 5; 450 INJECTION, SOLUTION INTRAVENOUS at 18:01

## 2023-12-23 RX ADMIN — SODIUM PHOSPHATE, MONOBASIC, MONOHYDRATE AND SODIUM PHOSPHATE, DIBASIC, ANHYDROUS 15 MMOL: 142; 276 INJECTION, SOLUTION INTRAVENOUS at 21:41

## 2023-12-23 RX ADMIN — POTASSIUM CHLORIDE 10 MEQ: 7.46 INJECTION, SOLUTION INTRAVENOUS at 03:18

## 2023-12-23 RX ADMIN — DEXTROSE MONOHYDRATE 15 ML: 25 INJECTION, SOLUTION INTRAVENOUS at 01:44

## 2023-12-23 RX ADMIN — POTASSIUM CHLORIDE 10 MEQ: 7.46 INJECTION, SOLUTION INTRAVENOUS at 04:34

## 2023-12-23 RX ADMIN — DEXTROSE MONOHYDRATE 16 ML: 25 INJECTION, SOLUTION INTRAVENOUS at 01:28

## 2023-12-23 RX ADMIN — POTASSIUM CHLORIDE 10 MEQ: 7.46 INJECTION, SOLUTION INTRAVENOUS at 05:34

## 2023-12-23 RX ADMIN — POTASSIUM CHLORIDE 10 MEQ: 7.46 INJECTION, SOLUTION INTRAVENOUS at 19:06

## 2023-12-23 RX ADMIN — Medication 10 ML: at 08:55

## 2023-12-23 RX ADMIN — Medication 10 ML: at 20:50

## 2023-12-23 RX ADMIN — PROCHLORPERAZINE EDISYLATE 5 MG: 5 INJECTION INTRAMUSCULAR; INTRAVENOUS at 09:52

## 2023-12-23 RX ADMIN — POTASSIUM CHLORIDE 10 MEQ: 7.46 INJECTION, SOLUTION INTRAVENOUS at 16:44

## 2023-12-23 RX ADMIN — POTASSIUM PHOSPHATE, MONOBASIC POTASSIUM PHOSPHATE, DIBASIC 15 MMOL: 224; 236 INJECTION, SOLUTION, CONCENTRATE INTRAVENOUS at 14:32

## 2023-12-23 RX ADMIN — POTASSIUM CHLORIDE 10 MEQ: 7.46 INJECTION, SOLUTION INTRAVENOUS at 02:04

## 2023-12-23 RX ADMIN — POTASSIUM CHLORIDE 10 MEQ: 7.46 INJECTION, SOLUTION INTRAVENOUS at 17:54

## 2023-12-23 RX ADMIN — DEXTROSE AND SODIUM CHLORIDE 150 ML/HR: 5; 450 INJECTION, SOLUTION INTRAVENOUS at 10:46

## 2023-12-23 RX ADMIN — INSULIN LISPRO 8 UNITS: 100 INJECTION, SOLUTION INTRAVENOUS; SUBCUTANEOUS at 21:49

## 2023-12-23 RX ADMIN — POTASSIUM CHLORIDE 10 MEQ: 7.46 INJECTION, SOLUTION INTRAVENOUS at 20:50

## 2023-12-23 NOTE — PLAN OF CARE
Goal Outcome Evaluation:               VSS, Oriented to self, place, situation. Lethargic but much more alert by evening. Insulin drip remains on per NP (pt is unable to eat to transition). Fluids changed per NP orders (see MAR/orders). Nausea with one episode of emesis (zofran resolved n/v).

## 2023-12-23 NOTE — PROGRESS NOTES
"  Intensive Care Unit Daily Progress Note.   [facility] INTENSIVE CARE  12/23/2023    Patient Name:  Marnie Middleton  MRN:  [id number]  YOB: 1960  Age: 63 y.o.  Sex: female         Reason for Admission / Chief Complaint:  Diabetic ketoacidosis    Hospital Course:   63-year-old female who presented to the hospital on 12/22 with altered mental status and found to be in diabetic ketoacidosis initiated on insulin drip and admitted to the ICU for further management.    Interval History:  -Episode of emesis overnight  -Remains confused on examination   -States \"she wants hippopotamus for [place]ena    Physical Exam:  /83   Pulse 95   Temp 97.5 °F (36.4 °C) (Oral)   Resp 23   Ht 149.9 cm (59.02\")   Wt 63.4 kg (139 lb 12.4 oz)   SpO2 97%   BMI 28.22 kg/m²   Body mass index is 28.22 kg/m².    Intake/Output    Intake/Output Summary (Last 24 hours) at 12/23/2023 0715  Last data filed at 12/23/2023 0550  Gross per 24 hour   Intake 5844 ml   Output 400 ml   Net 5444 ml     General: Alert, altered  HEENT: NC/AT, EOMI, MMM  Neck: Supple, trachea midline  Cardiac: RRR, no murmur, gallops, rubs  Pulmonary: Clear to auscultation bilaterally, no adventitious breath sounds, normal respiratory effort  GI: Soft, non-tender, non-distended, normal bowel sounds  Extremities: Warm, well perfused, no LE edema  Skin: no visible rash  Neuro: CN II - XII grossly intact  Psychiatry: Slowed    Data Review:  Notable Labs:  Results from last 7 days   Lab Units 12/21/23  2202   WBC 10*3/mm3 15.91*   HEMOGLOBIN g/dL 18.3*   PLATELETS 10*3/mm3 345     Results from last 7 days   Lab Units 12/23/23  0404 12/23/23  0015 12/22/23  2005 12/22/23  1552 12/22/23  1213 12/22/23  0805 12/22/23  0356 12/22/23  0125   SODIUM mmol/L 156* 159* 158* 163* 164* 160* 159* 155*   POTASSIUM mmol/L 3.7 3.5 4.1 3.9 3.9 4.7 4.1 4.0   CHLORIDE mmol/L 126* 127* 129* 127* 131* 124* 118* 111*   CO2 mmol/L 20.0* 19.0* 18.0* 16.0* 18.3* " 12.1* 10.0* 9.0*   BUN mg/dL 19 23 27* 37* 38* 40* 55* 61*   CREATININE mg/dL 1.02* 1.18* 1.23* 1.42* 1.30* 1.48* 2.12* 2.25*   GLUCOSE mg/dL 194* 132* 238* 301* 293* 460* 748* 795*   CALCIUM mg/dL 7.6* 7.6* 7.6* 8.4* 7.9* 7.6* 8.9 9.4   MAGNESIUM mg/dL 2.1 2.2  --  2.9* 2.7* 2.8* 3.5* 3.5*   PHOSPHORUS mg/dL 1.0* 1.2*  --  1.7* 1.0* 1.9* 4.2 4.5   Estimated Creatinine Clearance: 48 mL/min (A) (by C-G formula based on SCr of 1.02 mg/dL (H)).    Results from last 7 days   Lab Units 12/22/23  1552 12/22/23  0624 12/22/23  0356 12/22/23  0023 12/21/23  2202   AST (SGOT) U/L  --   --   --   --  18   ALT (SGPT) U/L  --   --   --   --  33   LACTATE mmol/L 4.3* 1.7  1.7 5.3*   < > 5.6*   PLATELETS 10*3/mm3  --   --   --   --  345    < > = values in this interval not displayed.       Results from last 7 days   Lab Units 12/21/23  2234   PH, ARTERIAL pH units 7.294*   PCO2, ARTERIAL mm Hg 28.3*   PO2 ART mm Hg 97.4   HCO3 ART mmol/L 13.7*       Imaging:  Reviewed chest images personally from past 3 days    ASSESSMENT  /  PLAN:    Diabetic ketoacidosis  Hypophosphatemia  Toxic metabolic encephalopathy  Newly diagnosed diabetes mellitus, hemoglobin A1c 14.7  Severe dehydration as result of the above  Acute renal failure as a result of the above-improving  Hypernatremia  Leukocytosis, likely stress related  Lactic acidosis  Left basilar consolidation     -DKA slowly improving  -D5 half-normal saline for slowly correcting hypernatremia, continue every 4 hours BMPs, if not correcting appropriately will consider nephrology consult  -Severe hypophosphatemia, replacement per protocol and additional as needed  -Procalcitonin ordered, if elevated will initiate antibiotics  -1 episode of emesis overnight, none after Zofran.  Will monitor.  -Repeat CBC to evaluate leukocytosis, if increasing will consider antibiotics for suspected underlying infectious etiology  -Left basilar consolidation may be in the setting of aspiration.  Will  need repeat chest x-ray in 6 to 8 weeks after discharge.  -Patient will remain in the ICU due to critical state from severe electrolyte disturbances and toxic metabolic encephalopathy.    GI prophylaxis: Not indicated  DVT prophylaxis: SCDs  Ohara catheter: No  Bowel regimen: Ordered  Diet: N.p.o.    Discharge: Continue to monitor in the ICU due to critical status    Critical Care Time: 35 minutes    Prince Choudhury MD  [facility]  Pulmonary and Critical Care Medicine, Interventional Pulmonology    Parts of this note may be an electronic transcription/translation of spoken language to printed text using the Dragon dictation system.

## 2023-12-23 NOTE — PROGRESS NOTES
Reviewed BMP with RN.  Stop Glucomander as appears resolved with DKA.  Continue D5 half at current rate.  Patient has not been on insulin at home prior to this and new diagnosis diabetes.  Will start with Lantus insulin 20 units twice daily and high-dose sliding scale.  Instructed RN to continue BMP check every 6 hours.  Need to monitor for acute hypernatremia as well.    Electronically signed by Nahun Hernandez MD, 12/23/23, 4:21 PM EST.

## 2023-12-23 NOTE — PLAN OF CARE
Goal Outcome Evaluation:    SLP continues to follow for bedside swallow evaluation. D/w RN Yandy, pt remains on insulin drip, c/o nausea, vomiting. Will hold bedside swallow evaluation, complete as pt appropriate for participation and cleared for PO.

## 2023-12-24 PROBLEM — D72.829 LEUKOCYTOSIS: Status: ACTIVE | Noted: 2023-12-24

## 2023-12-24 PROBLEM — E86.0 DEHYDRATION: Status: ACTIVE | Noted: 2023-12-24

## 2023-12-24 PROBLEM — R93.89 ABNORMAL CXR: Status: ACTIVE | Noted: 2023-12-24

## 2023-12-24 PROBLEM — E87.0 HYPERNATREMIA: Status: ACTIVE | Noted: 2023-12-24

## 2023-12-24 PROBLEM — E11.9 NEW ONSET TYPE 2 DIABETES MELLITUS: Status: ACTIVE | Noted: 2023-12-24

## 2023-12-24 LAB
ANION GAP SERPL CALCULATED.3IONS-SCNC: 10 MMOL/L (ref 5–15)
ANION GAP SERPL CALCULATED.3IONS-SCNC: 8 MMOL/L (ref 5–15)
ANION GAP SERPL CALCULATED.3IONS-SCNC: 9 MMOL/L (ref 5–15)
BASOPHILS # BLD AUTO: 0.02 10*3/MM3 (ref 0–0.2)
BASOPHILS NFR BLD AUTO: 0.2 % (ref 0–1.5)
BUN SERPL-MCNC: 7 MG/DL (ref 8–23)
BUN SERPL-MCNC: 8 MG/DL (ref 8–23)
BUN SERPL-MCNC: 8 MG/DL (ref 8–23)
BUN/CREAT SERPL: 10.8 (ref 7–25)
BUN/CREAT SERPL: 12.3 (ref 7–25)
BUN/CREAT SERPL: 12.9 (ref 7–25)
CALCIUM SPEC-SCNC: 6.5 MG/DL (ref 8.6–10.5)
CALCIUM SPEC-SCNC: 8.3 MG/DL (ref 8.6–10.5)
CALCIUM SPEC-SCNC: 8.5 MG/DL (ref 8.6–10.5)
CHLORIDE SERPL-SCNC: 116 MMOL/L (ref 98–107)
CHLORIDE SERPL-SCNC: 116 MMOL/L (ref 98–107)
CHLORIDE SERPL-SCNC: 121 MMOL/L (ref 98–107)
CO2 SERPL-SCNC: 21 MMOL/L (ref 22–29)
CO2 SERPL-SCNC: 21 MMOL/L (ref 22–29)
CO2 SERPL-SCNC: 22 MMOL/L (ref 22–29)
CREAT SERPL-MCNC: 0.62 MG/DL (ref 0.57–1)
CREAT SERPL-MCNC: 0.65 MG/DL (ref 0.57–1)
CREAT SERPL-MCNC: 0.65 MG/DL (ref 0.57–1)
DEPRECATED RDW RBC AUTO: 41.6 FL (ref 37–54)
EGFRCR SERPLBLD CKD-EPI 2021: 98.5 ML/MIN/1.73
EGFRCR SERPLBLD CKD-EPI 2021: 98.5 ML/MIN/1.73
EGFRCR SERPLBLD CKD-EPI 2021: 99.6 ML/MIN/1.73
EOSINOPHIL # BLD AUTO: 0 10*3/MM3 (ref 0–0.4)
EOSINOPHIL NFR BLD AUTO: 0 % (ref 0.3–6.2)
ERYTHROCYTE [DISTWIDTH] IN BLOOD BY AUTOMATED COUNT: 12.5 % (ref 12.3–15.4)
GLUCOSE BLDC GLUCOMTR-MCNC: 216 MG/DL (ref 70–130)
GLUCOSE BLDC GLUCOMTR-MCNC: 231 MG/DL (ref 70–130)
GLUCOSE BLDC GLUCOMTR-MCNC: 232 MG/DL (ref 70–130)
GLUCOSE BLDC GLUCOMTR-MCNC: 241 MG/DL (ref 70–130)
GLUCOSE BLDC GLUCOMTR-MCNC: 261 MG/DL (ref 70–130)
GLUCOSE BLDC GLUCOMTR-MCNC: 266 MG/DL (ref 70–130)
GLUCOSE SERPL-MCNC: 237 MG/DL (ref 65–99)
GLUCOSE SERPL-MCNC: 250 MG/DL (ref 65–99)
GLUCOSE SERPL-MCNC: 303 MG/DL (ref 65–99)
HCT VFR BLD AUTO: 33.4 % (ref 34–46.6)
HGB BLD-MCNC: 11.3 G/DL (ref 12–15.9)
LYMPHOCYTES # BLD AUTO: 2.32 10*3/MM3 (ref 0.7–3.1)
LYMPHOCYTES NFR BLD AUTO: 20.8 % (ref 19.6–45.3)
MAGNESIUM SERPL-MCNC: 1.8 MG/DL (ref 1.6–2.4)
MCH RBC QN AUTO: 30.8 PG (ref 26.6–33)
MCHC RBC AUTO-ENTMCNC: 33.8 G/DL (ref 31.5–35.7)
MCV RBC AUTO: 91 FL (ref 79–97)
MONOCYTES # BLD AUTO: 0.49 10*3/MM3 (ref 0.1–0.9)
MONOCYTES NFR BLD AUTO: 4.4 % (ref 5–12)
NEUTROPHILS NFR BLD AUTO: 73.2 % (ref 42.7–76)
NEUTROPHILS NFR BLD AUTO: 8.17 10*3/MM3 (ref 1.7–7)
PHOSPHATE SERPL-MCNC: 2.8 MG/DL (ref 2.5–4.5)
PLATELET # BLD AUTO: 67 10*3/MM3 (ref 140–450)
PMV BLD AUTO: 11.7 FL (ref 6–12)
POTASSIUM SERPL-SCNC: 3.1 MMOL/L (ref 3.5–5.2)
POTASSIUM SERPL-SCNC: 3.7 MMOL/L (ref 3.5–5.2)
POTASSIUM SERPL-SCNC: 3.7 MMOL/L (ref 3.5–5.2)
RBC # BLD AUTO: 3.67 10*6/MM3 (ref 3.77–5.28)
SODIUM SERPL-SCNC: 146 MMOL/L (ref 136–145)
SODIUM SERPL-SCNC: 146 MMOL/L (ref 136–145)
SODIUM SERPL-SCNC: 152 MMOL/L (ref 136–145)
WBC NRBC COR # BLD AUTO: 11.16 10*3/MM3 (ref 3.4–10.8)

## 2023-12-24 PROCEDURE — 25010000002 PROCHLORPERAZINE 10 MG/2ML SOLUTION: Performed by: HOSPITALIST

## 2023-12-24 PROCEDURE — 25010000002 POTASSIUM CHLORIDE 10 MEQ/100ML SOLUTION

## 2023-12-24 PROCEDURE — 83735 ASSAY OF MAGNESIUM: CPT

## 2023-12-24 PROCEDURE — 63710000001 INSULIN LISPRO (HUMAN) PER 5 UNITS

## 2023-12-24 PROCEDURE — 25010000002 CALCIUM GLUCONATE-NACL 1-0.675 GM/50ML-% SOLUTION

## 2023-12-24 PROCEDURE — 80048 BASIC METABOLIC PNL TOTAL CA: CPT | Performed by: HOSPITALIST

## 2023-12-24 PROCEDURE — 82948 REAGENT STRIP/BLOOD GLUCOSE: CPT

## 2023-12-24 PROCEDURE — 25010000002 CALCIUM GLUCONATE 2-0.675 GM/100ML-% SOLUTION

## 2023-12-24 PROCEDURE — 85025 COMPLETE CBC W/AUTO DIFF WBC: CPT | Performed by: HOSPITALIST

## 2023-12-24 PROCEDURE — 25810000003 SODIUM CHLORIDE 0.9 % SOLUTION: Performed by: INTERNAL MEDICINE

## 2023-12-24 PROCEDURE — 25010000002 KETOROLAC TROMETHAMINE PER 15 MG: Performed by: NURSE PRACTITIONER

## 2023-12-24 PROCEDURE — 63710000001 INSULIN GLARGINE PER 5 UNITS: Performed by: INTERNAL MEDICINE

## 2023-12-24 PROCEDURE — 80048 BASIC METABOLIC PNL TOTAL CA: CPT

## 2023-12-24 PROCEDURE — 84100 ASSAY OF PHOSPHORUS: CPT

## 2023-12-24 RX ORDER — CALCIUM GLUCONATE 20 MG/ML
2000 INJECTION, SOLUTION INTRAVENOUS
Status: COMPLETED | OUTPATIENT
Start: 2023-12-24 | End: 2023-12-24

## 2023-12-24 RX ORDER — KETOROLAC TROMETHAMINE 15 MG/ML
15 INJECTION, SOLUTION INTRAMUSCULAR; INTRAVENOUS ONCE
Status: COMPLETED | OUTPATIENT
Start: 2023-12-24 | End: 2023-12-24

## 2023-12-24 RX ORDER — CALCIUM GLUCONATE 20 MG/ML
1000 INJECTION, SOLUTION INTRAVENOUS
Status: COMPLETED | OUTPATIENT
Start: 2023-12-24 | End: 2023-12-24

## 2023-12-24 RX ORDER — POTASSIUM CHLORIDE 7.45 MG/ML
10 INJECTION INTRAVENOUS
Status: COMPLETED | OUTPATIENT
Start: 2023-12-24 | End: 2023-12-24

## 2023-12-24 RX ADMIN — INSULIN LISPRO 8 UNITS: 100 INJECTION, SOLUTION INTRAVENOUS; SUBCUTANEOUS at 20:55

## 2023-12-24 RX ADMIN — CALCIUM GLUCONATE 2000 MG: 20 INJECTION, SOLUTION INTRAVENOUS at 06:27

## 2023-12-24 RX ADMIN — POTASSIUM CHLORIDE 10 MEQ: 7.46 INJECTION, SOLUTION INTRAVENOUS at 09:00

## 2023-12-24 RX ADMIN — INSULIN LISPRO 8 UNITS: 100 INJECTION, SOLUTION INTRAVENOUS; SUBCUTANEOUS at 05:17

## 2023-12-24 RX ADMIN — Medication 10 ML: at 08:04

## 2023-12-24 RX ADMIN — INSULIN GLARGINE 20 UNITS: 100 INJECTION, SOLUTION SUBCUTANEOUS at 20:55

## 2023-12-24 RX ADMIN — INSULIN LISPRO 12 UNITS: 100 INJECTION, SOLUTION INTRAVENOUS; SUBCUTANEOUS at 13:22

## 2023-12-24 RX ADMIN — DEXTROSE AND SODIUM CHLORIDE 150 ML/HR: 5; 450 INJECTION, SOLUTION INTRAVENOUS at 22:04

## 2023-12-24 RX ADMIN — POTASSIUM CHLORIDE 10 MEQ: 7.46 INJECTION, SOLUTION INTRAVENOUS at 11:15

## 2023-12-24 RX ADMIN — DEXTROSE AND SODIUM CHLORIDE 150 ML/HR: 5; 450 INJECTION, SOLUTION INTRAVENOUS at 07:57

## 2023-12-24 RX ADMIN — Medication 10 ML: at 20:55

## 2023-12-24 RX ADMIN — CALCIUM GLUCONATE 2000 MG: 20 INJECTION, SOLUTION INTRAVENOUS at 08:03

## 2023-12-24 RX ADMIN — POTASSIUM CHLORIDE 10 MEQ: 7.46 INJECTION, SOLUTION INTRAVENOUS at 05:17

## 2023-12-24 RX ADMIN — KETOROLAC TROMETHAMINE 15 MG: 15 INJECTION, SOLUTION INTRAMUSCULAR; INTRAVENOUS at 22:04

## 2023-12-24 RX ADMIN — INSULIN LISPRO 8 UNITS: 100 INJECTION, SOLUTION INTRAVENOUS; SUBCUTANEOUS at 16:54

## 2023-12-24 RX ADMIN — DEXTROSE AND SODIUM CHLORIDE 150 ML/HR: 5; 450 INJECTION, SOLUTION INTRAVENOUS at 00:50

## 2023-12-24 RX ADMIN — CALCIUM GLUCONATE 1000 MG: 20 INJECTION, SOLUTION INTRAVENOUS at 05:17

## 2023-12-24 RX ADMIN — DEXTROSE AND SODIUM CHLORIDE 150 ML/HR: 5; 450 INJECTION, SOLUTION INTRAVENOUS at 15:30

## 2023-12-24 RX ADMIN — POTASSIUM CHLORIDE 10 MEQ: 7.46 INJECTION, SOLUTION INTRAVENOUS at 08:00

## 2023-12-24 RX ADMIN — INSULIN GLARGINE 20 UNITS: 100 INJECTION, SOLUTION SUBCUTANEOUS at 08:11

## 2023-12-24 RX ADMIN — POTASSIUM CHLORIDE 10 MEQ: 7.46 INJECTION, SOLUTION INTRAVENOUS at 10:19

## 2023-12-24 RX ADMIN — CALCIUM GLUCONATE 2000 MG: 20 INJECTION, SOLUTION INTRAVENOUS at 11:00

## 2023-12-24 RX ADMIN — PROCHLORPERAZINE EDISYLATE 5 MG: 5 INJECTION INTRAMUSCULAR; INTRAVENOUS at 10:23

## 2023-12-24 RX ADMIN — POTASSIUM CHLORIDE 10 MEQ: 7.46 INJECTION, SOLUTION INTRAVENOUS at 06:19

## 2023-12-24 RX ADMIN — INSULIN LISPRO 8 UNITS: 100 INJECTION, SOLUTION INTRAVENOUS; SUBCUTANEOUS at 09:02

## 2023-12-24 RX ADMIN — INSULIN LISPRO 12 UNITS: 100 INJECTION, SOLUTION INTRAVENOUS; SUBCUTANEOUS at 00:27

## 2023-12-24 RX ADMIN — SODIUM PHOSPHATE, MONOBASIC, MONOHYDRATE AND SODIUM PHOSPHATE, DIBASIC, ANHYDROUS 15 MMOL: 142; 276 INJECTION, SOLUTION INTRAVENOUS at 00:37

## 2023-12-24 NOTE — PLAN OF CARE
Shift summary: Patient on SSI. Remains on D5 1/2 NS. Phosphorus, potassium and calcium replacements. Patient remains drowsy but wakes up appropriately.    Problem: Skin Injury Risk Increased  Goal: Skin Health and Integrity  Outcome: Ongoing, Progressing  Intervention: Optimize Skin Protection  Recent Flowsheet Documentation  Taken 12/24/2023 0600 by Kaye Gong RN  Head of Bed (HOB) Positioning: HOB at 30 degrees  Taken 12/24/2023 0400 by Kaye Gong RN  Head of Bed (HOB) Positioning: HOB at 20-30 degrees  Taken 12/24/2023 0200 by Kaye Gong RN  Head of Bed (HOB) Positioning: HOB at 20-30 degrees  Taken 12/24/2023 0000 by Kaye Gong RN  Head of Bed (HOB) Positioning: HOB at 20-30 degrees  Taken 12/23/2023 2200 by Kaey Gong RN  Head of Bed (HOB) Positioning: HOB at 30 degrees  Taken 12/23/2023 2000 by Kaye Gong RN  Head of Bed (HOB) Positioning: HOB at 30 degrees     Problem: Adjustment to Illness (Sepsis/Septic Shock)  Goal: Optimal Coping  Outcome: Ongoing, Progressing     Problem: Bleeding (Sepsis/Septic Shock)  Goal: Absence of Bleeding  Outcome: Ongoing, Progressing     Problem: Glycemic Control Impaired (Sepsis/Septic Shock)  Goal: Blood Glucose Level Within Desired Range  Outcome: Ongoing, Progressing     Problem: Infection Progression (Sepsis/Septic Shock)  Goal: Absence of Infection Signs and Symptoms  Outcome: Ongoing, Progressing  Intervention: Initiate Sepsis Management  Recent Flowsheet Documentation  Taken 12/24/2023 0600 by Kaye Gong RN  Infection Prevention:   environmental surveillance performed   hand hygiene promoted   personal protective equipment utilized   single patient room provided   rest/sleep promoted  Taken 12/24/2023 0500 by Kaye Gong RN  Infection Prevention:   environmental surveillance performed   hand hygiene promoted   personal protective equipment utilized   single patient room provided   rest/sleep promoted  Taken 12/24/2023 0400 by Kaye Gong  RN  Infection Prevention:   environmental surveillance performed   hand hygiene promoted   personal protective equipment utilized   single patient room provided   rest/sleep promoted  Taken 12/24/2023 0300 by Kaye Gong RN  Infection Prevention:   environmental surveillance performed   hand hygiene promoted   personal protective equipment utilized   single patient room provided   rest/sleep promoted  Taken 12/24/2023 0200 by Kaye Gong RN  Infection Prevention:   environmental surveillance performed   hand hygiene promoted   personal protective equipment utilized   single patient room provided   rest/sleep promoted  Taken 12/24/2023 0100 by Kaye Gong RN  Infection Prevention:   environmental surveillance performed   hand hygiene promoted   personal protective equipment utilized   single patient room provided   rest/sleep promoted  Taken 12/24/2023 0000 by Kaye Gong RN  Infection Prevention:   environmental surveillance performed   hand hygiene promoted   personal protective equipment utilized   single patient room provided   rest/sleep promoted  Taken 12/23/2023 2300 by Kaye Gong RN  Infection Prevention:   environmental surveillance performed   hand hygiene promoted   personal protective equipment utilized   single patient room provided   rest/sleep promoted  Taken 12/23/2023 2200 by Kaye Gong RN  Infection Prevention:   environmental surveillance performed   hand hygiene promoted   personal protective equipment utilized   single patient room provided   rest/sleep promoted  Taken 12/23/2023 2100 by Kaye Gong RN  Infection Prevention:   environmental surveillance performed   hand hygiene promoted   personal protective equipment utilized   single patient room provided   rest/sleep promoted  Taken 12/23/2023 2000 by Kaye Gong RN  Infection Prevention:   environmental surveillance performed   equipment surfaces disinfected   hand hygiene promoted   personal protective equipment  utilized   rest/sleep promoted   single patient room provided  Intervention: Promote Recovery  Recent Flowsheet Documentation  Taken 12/24/2023 0000 by Kaye Gong RN  Activity Management: bedrest  Taken 12/23/2023 2000 by Kaye Gong RN  Activity Management: bedrest     Problem: Nutrition Impaired (Sepsis/Septic Shock)  Goal: Optimal Nutrition Intake  Outcome: Ongoing, Progressing     Problem: Fall Injury Risk  Goal: Absence of Fall and Fall-Related Injury  Outcome: Ongoing, Progressing  Intervention: Identify and Manage Contributors  Recent Flowsheet Documentation  Taken 12/24/2023 0600 by Kaye Gong RN  Medication Review/Management: medications reviewed  Taken 12/24/2023 0500 by Kaye Gong RN  Medication Review/Management: medications reviewed  Taken 12/24/2023 0400 by Kaye Gong RN  Medication Review/Management: medications reviewed  Taken 12/24/2023 0300 by Kaye Gong RN  Medication Review/Management: medications reviewed  Taken 12/24/2023 0200 by Kaye Gong RN  Medication Review/Management: medications reviewed  Taken 12/24/2023 0100 by Kaye Gong RN  Medication Review/Management: medications reviewed  Taken 12/24/2023 0000 by Kaye Gong RN  Medication Review/Management: medications reviewed  Taken 12/23/2023 2300 by Kaye Gong RN  Medication Review/Management: medications reviewed  Taken 12/23/2023 2200 by Kaye Gong RN  Medication Review/Management: medications reviewed  Taken 12/23/2023 2100 by Kaye Gong RN  Medication Review/Management: medications reviewed  Taken 12/23/2023 2000 by Kaye Gong RN  Medication Review/Management: medications reviewed  Intervention: Promote Injury-Free Environment  Recent Flowsheet Documentation  Taken 12/24/2023 0600 by Kaye Gong RN  Safety Promotion/Fall Prevention:   assistive device/personal items within reach   clutter free environment maintained   fall prevention program maintained   lighting adjusted   nonskid  shoes/slippers when out of bed   safety round/check completed  Taken 12/24/2023 0500 by Kaye Gong RN  Safety Promotion/Fall Prevention:   assistive device/personal items within reach   clutter free environment maintained   fall prevention program maintained   lighting adjusted   nonskid shoes/slippers when out of bed   safety round/check completed  Taken 12/24/2023 0400 by Kaye Gong RN  Safety Promotion/Fall Prevention:   assistive device/personal items within reach   clutter free environment maintained   fall prevention program maintained   lighting adjusted   nonskid shoes/slippers when out of bed   safety round/check completed  Taken 12/24/2023 0300 by Kaye Gong RN  Safety Promotion/Fall Prevention:   assistive device/personal items within reach   clutter free environment maintained   fall prevention program maintained   lighting adjusted   nonskid shoes/slippers when out of bed   safety round/check completed  Taken 12/24/2023 0200 by Kaye Gong RN  Safety Promotion/Fall Prevention:   assistive device/personal items within reach   clutter free environment maintained   fall prevention program maintained   lighting adjusted   nonskid shoes/slippers when out of bed   safety round/check completed  Taken 12/24/2023 0100 by Kaye Gong RN  Safety Promotion/Fall Prevention:   assistive device/personal items within reach   clutter free environment maintained   fall prevention program maintained   lighting adjusted   nonskid shoes/slippers when out of bed   safety round/check completed  Taken 12/24/2023 0000 by Kaye Gong RN  Safety Promotion/Fall Prevention:   assistive device/personal items within reach   clutter free environment maintained   fall prevention program maintained   lighting adjusted   nonskid shoes/slippers when out of bed   safety round/check completed  Taken 12/23/2023 2300 by Kaye Gong RN  Safety Promotion/Fall Prevention:   assistive device/personal items within reach    clutter free environment maintained   fall prevention program maintained   lighting adjusted   nonskid shoes/slippers when out of bed   safety round/check completed  Taken 12/23/2023 2200 by Kaye Gong, EWA  Safety Promotion/Fall Prevention:   assistive device/personal items within reach   clutter free environment maintained   fall prevention program maintained   lighting adjusted   nonskid shoes/slippers when out of bed   safety round/check completed  Taken 12/23/2023 2100 by Kaye Gong RN  Safety Promotion/Fall Prevention:   assistive device/personal items within reach   clutter free environment maintained   fall prevention program maintained   lighting adjusted   nonskid shoes/slippers when out of bed   safety round/check completed  Taken 12/23/2023 2000 by Kaye Gong, EWA  Safety Promotion/Fall Prevention:   assistive device/personal items within reach   clutter free environment maintained   fall prevention program maintained   lighting adjusted   nonskid shoes/slippers when out of bed   safety round/check completed   Goal Outcome Evaluation:

## 2023-12-24 NOTE — CONSULTS
Patient Name:  Marnie Middleton  YOB: 1960  Patient Care Team:  Provider, No Known as PCP - General    Date of Admission:  12/21/2023  Date of Consult:  12/24/2023    Inpatient Internal Medicine Consult  Consult performed by: Jose Kidd MD  Consult ordered by: Prince Choudhury MD        Reason for consult: Evaluate status and make recommendations regarding treatment for diabetes.      Subjective   History of Present Illness  Ms. Middleton is a 63 y.o. female w/ HLD admitted for lethargy, found to be in DKA with newly diagnosed DM.  Also with acute renal failure and severe dehydration on admission.  She was originally admitted to the ICU and treated with insulin drip.  She has been transitioned to long-acting insulin and is now eating.  [facility] asked to see patient to help manage new diabetes.    She is currently resting in bed.  Denies any acute complaints.  She says she is not hungry at this time though no specific nausea or abdominal pain.      No past medical history on file.  Hypercholesterolemia       Herpes Zoster       Endometrial Cancer   at age 21, D and C done. Pt thinks cervical cancer not endometrial. but she is unsure   Arthritis 2015 feet, spine , neck, knees / pt sts that she was not told what type of arthritis she has.   Colonoscopy 2010     Screening For Breast Cancer 2016     Screening For Hpv 2016        No past surgical history on file.  FALLOPIAN TUBE(S), LIGATION OR TRANSECTION, ABDOMINAL OR VAGINAL APPROACH, UNILAT OR BILAT 1/1/1992 - 12/31/1992       DILATION AND CURETTAGE         TONSILLECTOMY         DILATION OF ESOPHAGUS 1/1/2016 - 12/31/2016      '  No family history on file.     Coronary Artery Disease Brother 2     Diabetes Father     Hypertension Father     Stroke Father     Breast Cancer Maternal Aunt unsure age   Breast Cancer Maternal Grandmother unsure age   Diabetes Paternal Grandfather     Dementia Paternal Grandmother       No medications prior to  admission.     Social History  Smoking Tobacco: Never           Smokeless Tobacco: Never         Allergies:  Aspartame and phenylalanine, Aspirin, and Tylenol [acetaminophen]    Review of Systems   Constitutional:  Negative for activity change, appetite change, diaphoresis and fatigue.   HENT:  Negative for congestion, ear discharge, mouth sores and tinnitus.    Eyes:  Negative for pain, discharge and redness.   Respiratory:  Negative for cough, choking, chest tightness, shortness of breath and wheezing.    Cardiovascular:  Negative for chest pain and palpitations.   Gastrointestinal:  Negative for abdominal pain, constipation, diarrhea and nausea.   Endocrine: Negative for cold intolerance and heat intolerance.   Genitourinary:  Negative for flank pain, frequency, hematuria, urgency and vaginal discharge.   Musculoskeletal:  Negative for back pain, joint swelling and neck pain.   Skin:  Negative for color change and rash.   Neurological:  Negative for syncope, speech difficulty and headaches.   Psychiatric/Behavioral:  Negative for agitation, behavioral problems and hallucinations.        Objective      Vital Signs  Temp:  [98 °F (36.7 °C)-100.4 °F (38 °C)] 98.8 °F (37.1 °C)  Heart Rate:  [] 101  BP: (114-157)/(67-90) 138/83  Body mass index is 28.22 kg/m².    Physical Exam  Constitutional:       General: She is not in acute distress.     Appearance: She is obese. She is ill-appearing. She is not toxic-appearing.   Cardiovascular:      Rate and Rhythm: Normal rate and regular rhythm.   Pulmonary:      Effort: Pulmonary effort is normal.   Abdominal:      General: Abdomen is flat. Bowel sounds are normal.      Palpations: Abdomen is soft.   Skin:     General: Skin is warm.   Neurological:      General: No focal deficit present.      Mental Status: She is alert and oriented to person, place, and time.   Psychiatric:         Mood and Affect: Mood normal.         Behavior: Behavior normal.         Results Review:    I have personally reviewed:  [x]  Laboratory  [x]  Old records  [x]  Radiology   [x]  EKG/Telemetry   []  Microbiology    []  Cardiology/Vascular   []  Pathology          Active Hospital Problems    Diagnosis  POA    **DKA (diabetic ketoacidosis) [E11.10]  Yes    Hypernatremia [E87.0]  Yes    Leukocytosis [D72.829]  Yes    New onset type 2 diabetes mellitus [E11.9]  Yes    Dehydration [E86.0]  Yes    Abnormal CXR [R93.89]  Yes       New onset DM2  Diabetic ketoacidosis, resolved  -A1c 14.7  -Glargine 20 units twice daily, SSI; will monitor and adjust based off needs  -Diabetic educator to see  -Will need close follow-up with PCP postdischarge    Hypernatremia  ARF, improved  Dehydration  -related to above  -cont IVF    Leukocytosis  -Suspect reactive    Left basilar consolidation  -Seen on CXR  -Repeat CXR in 2 months to ensure resolution (around 2/24/2023)      Thank you very much for asking [facility] to be involved in this patient's care. We will follow along with you.      Jose Kidd MD  [facility]  12/24/23  12:50 EST

## 2023-12-24 NOTE — PLAN OF CARE
Goal Outcome Evaluation:  Plan of Care Reviewed With: patient        Progress: improving  Outcome Evaluation: Patient transferred from ICU this afternoon, patient is alert and oriented x4, on room air, VSS, NSR on telemetry, patient was tired on arrival and has since been more interactive with staff.

## 2023-12-24 NOTE — PROGRESS NOTES
"  Intensive Care Unit Daily Progress Note.   [facility] INTENSIVE CARE  12/24/2023    Patient Name:  Marnie Middleton  MRN:  [id number]  YOB: 1960  Age: 63 y.o.  Sex: female         Reason for Admission / Chief Complaint:  Diabetic ketoacidosis    Hospital Course:   63-year-old female who presented to the hospital on 12/22 with altered mental status and found to be in diabetic ketoacidosis initiated on insulin drip and admitted to the ICU for further management.    Interval History:  -Transition off Glucomander overnight  -States that she is feeling slightly better  -Continues to feel weak and really wants to drink  -Denies any chest pain or palpitations  -Denies any nausea or vomiting  -Denies any shortness of breath or cough    Physical Exam:  /67   Pulse 93   Temp 100.4 °F (38 °C) (Oral)   Resp 23   Ht 149.9 cm (59.02\")   Wt 63.4 kg (139 lb 12.4 oz)   SpO2 93%   BMI 28.22 kg/m²   Body mass index is 28.22 kg/m².    Intake/Output    Intake/Output Summary (Last 24 hours) at 12/24/2023 0735  Last data filed at 12/24/2023 0151  Gross per 24 hour   Intake 2743 ml   Output 2000 ml   Net 743 ml     General: Alert, weak appearing  HEENT: NC/AT, EOMI, MMM  Neck: Supple, trachea midline  Cardiac: RRR, no murmur, gallops, rubs  Pulmonary: Clear to auscultation bilaterally, no adventitious breath sounds, normal respiratory effort  GI: Soft, non-tender, non-distended, normal bowel sounds  Extremities: Warm, well perfused, no LE edema  Skin: no visible rash  Neuro: CN II - XII grossly intact  Psychiatry: Slowed    Data Review:  Notable Labs:  Results from last 7 days   Lab Units 12/24/23  0321 12/23/23  0929 12/23/23  0819 12/21/23  2202   WBC 10*3/mm3 11.16* 16.18* 13.55* 15.91*   HEMOGLOBIN g/dL 11.3* 12.8 11.0* 18.3*   PLATELETS 10*3/mm3 67* 101* 86* 345     Results from last 7 days   Lab Units 12/24/23  0321 12/23/23 1957 12/23/23  1918 12/23/23  1430 12/23/23  0929 12/23/23  0819 " 12/23/23  0404   SODIUM mmol/L 152* 153* 152*  152* 152* 154* 143 156*   POTASSIUM mmol/L 3.1* 3.8 3.6  3.6 3.4* 3.2* 3.8 3.7   CHLORIDE mmol/L 121* 124* 124*  124* 125* 127* 119* 126*   CO2 mmol/L 21.0* 20.0* 20.0*  20.0* 19.4* 19.0* 17.0* 20.0*   BUN mg/dL 8 9 9  9 12 15 13 19   CREATININE mg/dL 0.62 0.66 0.69  0.69 0.77 0.85 0.80 1.02*   GLUCOSE mg/dL 250* 139* 135*  135* 148* 135* 715* 194*   CALCIUM mg/dL 6.5* 6.9* 6.9*  6.9* 7.0* 7.5* 5.9* 7.6*   MAGNESIUM mg/dL 1.8 1.8 1.7 1.9 2.0 1.7 2.1   PHOSPHORUS mg/dL 2.8 1.6* 1.5* 1.3* 0.6* 0.8* 1.0*   Estimated Creatinine Clearance: 79 mL/min (by C-G formula based on SCr of 0.62 mg/dL).    Results from last 7 days   Lab Units 12/24/23  0321 12/23/23  1918 12/23/23  1430 12/23/23  0929 12/23/23  0819 12/23/23  0404 12/22/23  1552 12/22/23  0624 12/22/23  0023 12/21/23  2202   AST (SGOT) U/L  --  29 --  30  --   --   --   --   --  18   ALT (SGPT) U/L  --  23 -- 23  --   --   --   --   --  33   PROCALCITONIN ng/mL  --   --   --   --   --  0.37*  --   --   --   --    LACTATE mmol/L  --   --  1.6  --   --   --  4.3* 1.7  1.7   < > 5.6*   PLATELETS 10*3/mm3 67*  --   --  101* 86*  --   --   --   --  345    < > = values in this interval not displayed.       Results from last 7 days   Lab Units 12/21/23  2234   PH, ARTERIAL pH units 7.294*   PCO2, ARTERIAL mm Hg 28.3*   PO2 ART mm Hg 97.4   HCO3 ART mmol/L 13.7*       Imaging:  Reviewed chest images personally from past 3 days    ASSESSMENT  /  PLAN:    Diabetic ketoacidosis-resolved  Hypophosphatemia  Toxic metabolic encephalopathy  Newly diagnosed diabetes mellitus, hemoglobin A1c 14.7  Severe dehydration as result of the above  Acute renal failure as a result of the above-improving  Hypernatremia  Leukocytosis, likely stress related  Lactic acidosis  Left basilar consolidation     -Transitioned off Glucomander, now on glargine 20 units twice daily and high-dose sliding scale, will continue to uptitrate  -Start  diabetic diet  -Continue D5 half-normal for hyponatremia  -No further episodes of emesis  -Procalcitonin negative, leukocytosis improving, likely just stress reaction from DKA  -Left basilar consolidation may be in the setting of aspiration.  Will need repeat chest x-ray in 6 to 8 weeks after discharge.    GI prophylaxis: Not indicated  DVT prophylaxis: SCDs  Ohara catheter: No  Bowel regimen: Ordered  Diet: N.p.o.    Discharge: Transfer to floor.  Internal medicine consulted to assume care.    Prince Choudhury MD  [facility]  Pulmonary and Critical Care Medicine, Interventional Pulmonology    Parts of this note may be an electronic transcription/translation of spoken language to printed text using the Dragon dictation system.

## 2023-12-25 LAB
ANION GAP SERPL CALCULATED.3IONS-SCNC: 10 MMOL/L (ref 5–15)
ANION GAP SERPL CALCULATED.3IONS-SCNC: 6.6 MMOL/L (ref 5–15)
ANION GAP SERPL CALCULATED.3IONS-SCNC: 6.8 MMOL/L (ref 5–15)
ANION GAP SERPL CALCULATED.3IONS-SCNC: 8 MMOL/L (ref 5–15)
BASOPHILS # BLD AUTO: 0 10*3/MM3 (ref 0–0.2)
BASOPHILS # BLD AUTO: 0.01 10*3/MM3 (ref 0–0.2)
BASOPHILS NFR BLD AUTO: 0 % (ref 0–1.5)
BASOPHILS NFR BLD AUTO: 0.1 % (ref 0–1.5)
BUN SERPL-MCNC: 6 MG/DL (ref 8–23)
BUN SERPL-MCNC: 6 MG/DL (ref 8–23)
BUN SERPL-MCNC: 7 MG/DL (ref 8–23)
BUN SERPL-MCNC: 7 MG/DL (ref 8–23)
BUN/CREAT SERPL: 10.5 (ref 7–25)
BUN/CREAT SERPL: 11.8 (ref 7–25)
BUN/CREAT SERPL: 11.9 (ref 7–25)
BUN/CREAT SERPL: 12.5 (ref 7–25)
CALCIUM SPEC-SCNC: 7.6 MG/DL (ref 8.6–10.5)
CALCIUM SPEC-SCNC: 7.6 MG/DL (ref 8.6–10.5)
CALCIUM SPEC-SCNC: 7.7 MG/DL (ref 8.6–10.5)
CALCIUM SPEC-SCNC: 7.8 MG/DL (ref 8.6–10.5)
CHLORIDE SERPL-SCNC: 113 MMOL/L (ref 98–107)
CHLORIDE SERPL-SCNC: 114 MMOL/L (ref 98–107)
CO2 SERPL-SCNC: 21 MMOL/L (ref 22–29)
CO2 SERPL-SCNC: 22.4 MMOL/L (ref 22–29)
CO2 SERPL-SCNC: 23 MMOL/L (ref 22–29)
CO2 SERPL-SCNC: 23.2 MMOL/L (ref 22–29)
CREAT SERPL-MCNC: 0.51 MG/DL (ref 0.57–1)
CREAT SERPL-MCNC: 0.56 MG/DL (ref 0.57–1)
CREAT SERPL-MCNC: 0.57 MG/DL (ref 0.57–1)
CREAT SERPL-MCNC: 0.59 MG/DL (ref 0.57–1)
DEPRECATED RDW RBC AUTO: 39.8 FL (ref 37–54)
DEPRECATED RDW RBC AUTO: 42.4 FL (ref 37–54)
EGFRCR SERPLBLD CKD-EPI 2021: 100.8 ML/MIN/1.73
EGFRCR SERPLBLD CKD-EPI 2021: 101.6 ML/MIN/1.73
EGFRCR SERPLBLD CKD-EPI 2021: 102.1 ML/MIN/1.73
EGFRCR SERPLBLD CKD-EPI 2021: 104.4 ML/MIN/1.73
EOSINOPHIL # BLD AUTO: 0.09 10*3/MM3 (ref 0–0.4)
EOSINOPHIL # BLD AUTO: 0.14 10*3/MM3 (ref 0–0.4)
EOSINOPHIL NFR BLD AUTO: 1.1 % (ref 0.3–6.2)
EOSINOPHIL NFR BLD AUTO: 1.6 % (ref 0.3–6.2)
ERYTHROCYTE [DISTWIDTH] IN BLOOD BY AUTOMATED COUNT: 12 % (ref 12.3–15.4)
ERYTHROCYTE [DISTWIDTH] IN BLOOD BY AUTOMATED COUNT: 12.8 % (ref 12.3–15.4)
GLUCOSE BLDC GLUCOMTR-MCNC: 187 MG/DL (ref 70–130)
GLUCOSE BLDC GLUCOMTR-MCNC: 197 MG/DL (ref 70–130)
GLUCOSE BLDC GLUCOMTR-MCNC: 239 MG/DL (ref 70–130)
GLUCOSE BLDC GLUCOMTR-MCNC: 250 MG/DL (ref 70–130)
GLUCOSE BLDC GLUCOMTR-MCNC: 252 MG/DL (ref 70–130)
GLUCOSE SERPL-MCNC: 174 MG/DL (ref 65–99)
GLUCOSE SERPL-MCNC: 202 MG/DL (ref 65–99)
GLUCOSE SERPL-MCNC: 234 MG/DL (ref 65–99)
GLUCOSE SERPL-MCNC: 241 MG/DL (ref 65–99)
HCT VFR BLD AUTO: 32 % (ref 34–46.6)
HCT VFR BLD AUTO: 35.6 % (ref 34–46.6)
HGB BLD-MCNC: 10.8 G/DL (ref 12–15.9)
HGB BLD-MCNC: 11.6 G/DL (ref 12–15.9)
IMM GRANULOCYTES # BLD AUTO: 0.05 10*3/MM3 (ref 0–0.05)
IMM GRANULOCYTES NFR BLD AUTO: 0.6 % (ref 0–0.5)
LYMPHOCYTES # BLD AUTO: 1.44 10*3/MM3 (ref 0.7–3.1)
LYMPHOCYTES # BLD AUTO: 1.46 10*3/MM3 (ref 0.7–3.1)
LYMPHOCYTES NFR BLD AUTO: 16.6 % (ref 19.6–45.3)
LYMPHOCYTES NFR BLD AUTO: 18.3 % (ref 19.6–45.3)
MCH RBC QN AUTO: 29.7 PG (ref 26.6–33)
MCH RBC QN AUTO: 31.2 PG (ref 26.6–33)
MCHC RBC AUTO-ENTMCNC: 32.6 G/DL (ref 31.5–35.7)
MCHC RBC AUTO-ENTMCNC: 33.8 G/DL (ref 31.5–35.7)
MCV RBC AUTO: 91.3 FL (ref 79–97)
MCV RBC AUTO: 92.5 FL (ref 79–97)
MONOCYTES # BLD AUTO: 0.35 10*3/MM3 (ref 0.1–0.9)
MONOCYTES # BLD AUTO: 0.57 10*3/MM3 (ref 0.1–0.9)
MONOCYTES NFR BLD AUTO: 4.4 % (ref 5–12)
MONOCYTES NFR BLD AUTO: 6.5 % (ref 5–12)
NEUTROPHILS NFR BLD AUTO: 5.92 10*3/MM3 (ref 1.7–7)
NEUTROPHILS NFR BLD AUTO: 6.56 10*3/MM3 (ref 1.7–7)
NEUTROPHILS NFR BLD AUTO: 74.7 % (ref 42.7–76)
NEUTROPHILS NFR BLD AUTO: 75.3 % (ref 42.7–76)
NRBC BLD AUTO-RTO: 0 /100 WBC (ref 0–0.2)
PLATELET # BLD AUTO: 56 10*3/MM3 (ref 140–450)
PLATELET # BLD AUTO: 59 10*3/MM3 (ref 140–450)
PMV BLD AUTO: 12.4 FL (ref 6–12)
PMV BLD AUTO: 12.5 FL (ref 6–12)
POTASSIUM SERPL-SCNC: 3.3 MMOL/L (ref 3.5–5.2)
POTASSIUM SERPL-SCNC: 3.4 MMOL/L (ref 3.5–5.2)
POTASSIUM SERPL-SCNC: 3.5 MMOL/L (ref 3.5–5.2)
POTASSIUM SERPL-SCNC: 3.5 MMOL/L (ref 3.5–5.2)
POTASSIUM SERPL-SCNC: 3.8 MMOL/L (ref 3.5–5.2)
RBC # BLD AUTO: 3.46 10*6/MM3 (ref 3.77–5.28)
RBC # BLD AUTO: 3.9 10*6/MM3 (ref 3.77–5.28)
SODIUM SERPL-SCNC: 142 MMOL/L (ref 136–145)
SODIUM SERPL-SCNC: 144 MMOL/L (ref 136–145)
SODIUM SERPL-SCNC: 145 MMOL/L (ref 136–145)
SODIUM SERPL-SCNC: 145 MMOL/L (ref 136–145)
WBC NRBC COR # BLD AUTO: 7.87 10*3/MM3 (ref 3.4–10.8)
WBC NRBC COR # BLD AUTO: 8.78 10*3/MM3 (ref 3.4–10.8)

## 2023-12-25 PROCEDURE — 80048 BASIC METABOLIC PNL TOTAL CA: CPT | Performed by: HOSPITALIST

## 2023-12-25 PROCEDURE — 85025 COMPLETE CBC W/AUTO DIFF WBC: CPT | Performed by: HOSPITALIST

## 2023-12-25 PROCEDURE — 82948 REAGENT STRIP/BLOOD GLUCOSE: CPT

## 2023-12-25 PROCEDURE — 84132 ASSAY OF SERUM POTASSIUM: CPT | Performed by: STUDENT IN AN ORGANIZED HEALTH CARE EDUCATION/TRAINING PROGRAM

## 2023-12-25 PROCEDURE — 86341 ISLET CELL ANTIBODY: CPT | Performed by: HOSPITALIST

## 2023-12-25 PROCEDURE — 63710000001 INSULIN GLARGINE PER 5 UNITS: Performed by: INTERNAL MEDICINE

## 2023-12-25 PROCEDURE — 63710000001 INSULIN LISPRO (HUMAN) PER 5 UNITS

## 2023-12-25 RX ORDER — OXYCODONE HYDROCHLORIDE 5 MG/1
5 TABLET ORAL EVERY 4 HOURS PRN
Status: DISCONTINUED | OUTPATIENT
Start: 2023-12-25 | End: 2023-12-29 | Stop reason: HOSPADM

## 2023-12-25 RX ORDER — PANTOPRAZOLE SODIUM 40 MG/1
40 TABLET, DELAYED RELEASE ORAL ONCE
Status: COMPLETED | OUTPATIENT
Start: 2023-12-25 | End: 2023-12-25

## 2023-12-25 RX ORDER — IBUPROFEN 400 MG/1
400 TABLET ORAL EVERY 8 HOURS PRN
Status: DISCONTINUED | OUTPATIENT
Start: 2023-12-25 | End: 2023-12-25

## 2023-12-25 RX ORDER — POTASSIUM CHLORIDE 750 MG/1
40 TABLET, FILM COATED, EXTENDED RELEASE ORAL EVERY 4 HOURS
Status: COMPLETED | OUTPATIENT
Start: 2023-12-25 | End: 2023-12-25

## 2023-12-25 RX ORDER — CALCIUM CARBONATE 500 MG/1
2 TABLET, CHEWABLE ORAL 3 TIMES DAILY PRN
Status: DISCONTINUED | OUTPATIENT
Start: 2023-12-25 | End: 2023-12-29 | Stop reason: HOSPADM

## 2023-12-25 RX ADMIN — INSULIN LISPRO 4 UNITS: 100 INJECTION, SOLUTION INTRAVENOUS; SUBCUTANEOUS at 01:28

## 2023-12-25 RX ADMIN — INSULIN GLARGINE 20 UNITS: 100 INJECTION, SOLUTION SUBCUTANEOUS at 09:15

## 2023-12-25 RX ADMIN — POTASSIUM CHLORIDE 40 MEQ: 750 TABLET, EXTENDED RELEASE ORAL at 04:42

## 2023-12-25 RX ADMIN — INSULIN GLARGINE 20 UNITS: 100 INJECTION, SOLUTION SUBCUTANEOUS at 21:08

## 2023-12-25 RX ADMIN — PANTOPRAZOLE SODIUM 40 MG: 40 TABLET, DELAYED RELEASE ORAL at 17:38

## 2023-12-25 RX ADMIN — INSULIN LISPRO 8 UNITS: 100 INJECTION, SOLUTION INTRAVENOUS; SUBCUTANEOUS at 09:15

## 2023-12-25 RX ADMIN — INSULIN LISPRO 12 UNITS: 100 INJECTION, SOLUTION INTRAVENOUS; SUBCUTANEOUS at 11:45

## 2023-12-25 RX ADMIN — INSULIN LISPRO 8 UNITS: 100 INJECTION, SOLUTION INTRAVENOUS; SUBCUTANEOUS at 04:42

## 2023-12-25 RX ADMIN — DEXTROSE AND SODIUM CHLORIDE 150 ML/HR: 5; 450 INJECTION, SOLUTION INTRAVENOUS at 04:43

## 2023-12-25 RX ADMIN — INSULIN LISPRO 4 UNITS: 100 INJECTION, SOLUTION INTRAVENOUS; SUBCUTANEOUS at 17:38

## 2023-12-25 RX ADMIN — POTASSIUM CHLORIDE 40 MEQ: 750 TABLET, EXTENDED RELEASE ORAL at 09:15

## 2023-12-25 RX ADMIN — Medication 10 ML: at 09:52

## 2023-12-25 RX ADMIN — OXYCODONE HYDROCHLORIDE 5 MG: 5 TABLET ORAL at 17:38

## 2023-12-25 RX ADMIN — ANTACID TABLETS 2 TABLET: 500 TABLET, CHEWABLE ORAL at 21:13

## 2023-12-25 RX ADMIN — INSULIN LISPRO 12 UNITS: 100 INJECTION, SOLUTION INTRAVENOUS; SUBCUTANEOUS at 21:08

## 2023-12-25 RX ADMIN — Medication 10 ML: at 21:08

## 2023-12-25 NOTE — PLAN OF CARE
Problem: Skin Injury Risk Increased  Goal: Skin Health and Integrity  Outcome: Ongoing, Progressing  Intervention: Optimize Skin Protection  Recent Flowsheet Documentation  Taken 12/25/2023 0555 by Sil Irvin RN  Head of Bed (HOB) Positioning: HOB at 20 degrees  Taken 12/25/2023 0420 by Sil Irvin RN  Head of Bed (HOB) Positioning: HOB at 20 degrees  Taken 12/24/2023 2205 by Sil Irvni RN  Head of Bed (HOB) Positioning: HOB at 15 degrees  Taken 12/24/2023 2002 by Sil Irvin RN  Pressure Reduction Techniques: frequent weight shift encouraged  Head of Bed (HOB) Positioning: HOB at 15 degrees  Skin Protection: adhesive use limited     Problem: Glycemic Control Impaired (Sepsis/Septic Shock)  Goal: Blood Glucose Level Within Desired Range  Outcome: Ongoing, Progressing  Intervention: Optimize Glycemic Control  Recent Flowsheet Documentation  Taken 12/25/2023 0012 by Sil Irvin RN  Glycemic Management: blood glucose monitored  Taken 12/24/2023 2002 by Sil Irvin RN  Glycemic Management: blood glucose monitored     Problem: Adult Inpatient Plan of Care  Goal: Absence of Hospital-Acquired Illness or Injury  Intervention: Identify and Manage Fall Risk  Recent Flowsheet Documentation  Taken 12/25/2023 0555 by Sil Irvin RN  Safety Promotion/Fall Prevention:   safety round/check completed   room organization consistent   nonskid shoes/slippers when out of bed   fall prevention program maintained   clutter free environment maintained   assistive device/personal items within reach   activity supervised  Taken 12/25/2023 0420 by Sil Irvin RN  Safety Promotion/Fall Prevention:   safety round/check completed   room organization consistent   nonskid shoes/slippers when out of bed   mobility aid in reach   fall prevention program maintained   clutter free environment maintained   assistive device/personal items within reach   activity supervised  Taken 12/25/2023 0225 by Sil Irvin  RN  Safety Promotion/Fall Prevention:   safety round/check completed   room organization consistent   clutter free environment maintained   assistive device/personal items within reach   activity supervised  Taken 12/25/2023 0012 by Sil Irvin RN  Safety Promotion/Fall Prevention:   safety round/check completed   room organization consistent   fall prevention program maintained   clutter free environment maintained   assistive device/personal items within reach   activity supervised  Taken 12/24/2023 2205 by Sil Irvin RN  Safety Promotion/Fall Prevention:   safety round/check completed   room organization consistent   nonskid shoes/slippers when out of bed   fall prevention program maintained   clutter free environment maintained   assistive device/personal items within reach   activity supervised  Taken 12/24/2023 2002 by Sil Irvin RN  Safety Promotion/Fall Prevention:   safety round/check completed   room organization consistent   patient off unit   nonskid shoes/slippers when out of bed   mobility aid in reach   fall prevention program maintained   clutter free environment maintained   assistive device/personal items within reach   activity supervised   Goal Outcome Evaluation:  Plan of Care Reviewed With: patient        Progress: improving  Outcome Evaluation: A&Ox4, x1 to bsc, accucheck q4, potassium replaced, complaints of HA during night requiring PRN - one time dose ordered. SR on monitor, falls precautions.

## 2023-12-25 NOTE — PROGRESS NOTES
[facility]     LOS: 4 days     Subjective:    CC:Altered Mental Status    DIET:  Diet Order   Procedures    Diet: Diabetic Diets; Consistent Carbohydrate; Texture: Regular Texture (IDDSI 7); Fluid Consistency: Thin (IDDSI 0)     Appetite still poor  Objective:    Vital Signs:  Temp:  [98.2 °F (36.8 °C)-99.1 °F (37.3 °C)] 98.2 °F (36.8 °C)  Heart Rate:  [75-89] 82  Resp:  [17-18] 18  BP: (121-141)/(61-79) 122/61    SpO2:  [95 %-97 %] 97 %  on   ;   Device (Oxygen Therapy): room air  Body mass index is 32.67 kg/m².    Physical Exam  Constitutional:       Appearance: Normal appearance.   HENT:      Head: Normocephalic and atraumatic.   Cardiovascular:      Rate and Rhythm: Normal rate and regular rhythm.      Heart sounds: No murmur heard.     No friction rub.   Pulmonary:      Effort: Pulmonary effort is normal.      Breath sounds: Normal breath sounds.   Abdominal:      General: Bowel sounds are normal. There is no distension.      Palpations: Abdomen is soft.      Tenderness: There is no abdominal tenderness.   Skin:     General: Skin is warm and dry.   Neurological:      Mental Status: She is alert.   Psychiatric:         Mood and Affect: Mood normal.         Behavior: Behavior normal.         Results Review:    Glucose   Date Value Ref Range Status   12/25/2023 241 (H) 65 - 99 mg/dL Final   12/25/2023 234 (H) 65 - 99 mg/dL Final   12/24/2023 237 (H) 65 - 99 mg/dL Final   12/24/2023 303 (H) 65 - 99 mg/dL Final   12/24/2023 250 (H) 65 - 99 mg/dL Final   12/23/2023 139 (H) 65 - 99 mg/dL Final     Results from last 7 days   Lab Units 12/25/23  0604   WBC 10*3/mm3 7.87   HEMOGLOBIN g/dL 11.6*   HEMATOCRIT % 35.6   PLATELETS 10*3/mm3 59*     Results from last 7 days   Lab Units 12/25/23  0604 12/23/23 1957 12/23/23  1918   SODIUM mmol/L 145   < > 152*  152*   POTASSIUM mmol/L 3.5  3.4*   < > 3.6  3.6   CHLORIDE mmol/L 114*   < > 124*  124*   CO2 mmol/L 21.0*   < > 20.0*  20.0*   BUN  mg/dL 7*   < > 9  9   CREATININE mg/dL 0.56*   < > 0.69  0.69   CALCIUM mg/dL 7.6*   < > 6.9*  6.9*   BILIRUBIN mg/dL  --   --  0.5   ALK PHOS U/L  --   --  98   ALT (SGPT) U/L  --   --  23   AST (SGOT) U/L  --   --  29   GLUCOSE mg/dL 241*   < > 135*  135*    < > = values in this interval not displayed.         Results from last 7 days   Lab Units 12/24/23  0321   MAGNESIUM mg/dL 1.8     Results from last 7 days   Lab Units 12/22/23  0125 12/21/23  2202   HSTROP T ng/L 11 13     Cultures:  Blood Culture   Date Value Ref Range Status   12/22/2023 No growth at 3 days  Preliminary   12/21/2023 No growth at 3 days  Preliminary       I have reviewed daily medications and changes in CPOE    Scheduled meds  insulin glargine, 20 Units, Subcutaneous, Q12H  insulin lispro, 4-24 Units, Subcutaneous, Q4H  pantoprazole, 40 mg, Oral, Once  sodium chloride, 10 mL, Intravenous, Q12H        dextrose 5 % and sodium chloride 0.45 %, 150 mL/hr  dextrose 5 % and sodium chloride 0.45 % with KCl 20 mEq/L, 150 mL/hr  dextrose 5 % and sodium chloride 0.45 % with KCl 40 mEq/L, 150 mL/hr  dextrose 5 % and sodium chloride 0.9 %, 150 mL/hr  dextrose 5 % and sodium chloride 0.9 % with KCl 20 mEq, 150 mL/hr  dextrose 5% and sodium chloride 0.9% with KCl 40 mEq/L, 150 mL/hr  insulin, 0-100 Units/hr, Last Rate: Stopped (12/23/23 1943)  sodium chloride 0.45 % 1,000 mL with potassium chloride 40 mEq infusion, 250 mL/hr  sodium chloride, 250 mL/hr  sodium chloride 0.45 % with KCl 20 mEq, 250 mL/hr, Last Rate: Stopped (12/22/23 1415)  sodium chloride, 250 mL/hr  sodium chloride 0.9 % with KCl 20 mEq, 250 mL/hr  sodium chloride 0.9 % with KCl 40 mEq/L, 250 mL/hr      PRN meds    Calcium Replacement - Follow Nurse / BPA Driven Protocol    dextrose    dextrose    dextrose    dextrose    dextrose 5 % and sodium chloride 0.45 %    dextrose 5 % and sodium chloride 0.45 % with KCl 20 mEq/L    dextrose 5 % and sodium chloride 0.45 % with KCl 40 mEq/L     dextrose 5 % and sodium chloride 0.9 %    dextrose 5 % and sodium chloride 0.9 % with KCl 20 mEq    dextrose 5% and sodium chloride 0.9% with KCl 40 mEq/L    glucagon (human recombinant)    glucagon (human recombinant)    ibuprofen    Magnesium Standard Dose Replacement - Follow Nurse / BPA Driven Protocol    ondansetron    Phosphorus Replacement - Follow Nurse / BPA Driven Protocol    Potassium Replacement - Follow Nurse / BPA Driven Protocol    prochlorperazine    sodium chloride 0.45 % 1,000 mL with potassium chloride 40 mEq infusion    sodium chloride    sodium chloride 0.45 % with KCl 20 mEq    [COMPLETED] Insert Peripheral IV **AND** sodium chloride    sodium chloride    sodium chloride    sodium chloride    sodium chloride 0.9 % with KCl 20 mEq    sodium chloride 0.9 % with KCl 40 mEq/L        DKA (diabetic ketoacidosis)    Hypernatremia    Leukocytosis    New onset type 2 diabetes mellitus    Dehydration    Abnormal CXR        Assessment/Plan:      New onset DM2  Diabetic ketoacidosis, resolved  -A1c 14.7  -Glargine 20 units twice daily, SSI; will monitor and adjust based off needs  -Diabetic educator to see  -Will need close follow-up with PCP postdischarge     Hypernatremia  ARF, improved  Dehydration  -related to above  -d/c fluids     Leukocytosis  -Suspect reactive     Left basilar consolidation  -chest ct     Diabetes educator  Will check antibodies -quintin    Yfn Adhikari MD  12/25/23  14:53 EST

## 2023-12-26 ENCOUNTER — APPOINTMENT (OUTPATIENT)
Dept: CT IMAGING | Facility: HOSPITAL | Age: 63
End: 2023-12-26

## 2023-12-26 LAB
ANION GAP SERPL CALCULATED.3IONS-SCNC: 8 MMOL/L (ref 5–15)
ANION GAP SERPL CALCULATED.3IONS-SCNC: 9.2 MMOL/L (ref 5–15)
BACTERIA SPEC AEROBE CULT: NORMAL
BUN SERPL-MCNC: 6 MG/DL (ref 8–23)
BUN SERPL-MCNC: 9 MG/DL (ref 8–23)
BUN/CREAT SERPL: 11.1 (ref 7–25)
BUN/CREAT SERPL: 13.2 (ref 7–25)
CALCIUM SPEC-SCNC: 7.9 MG/DL (ref 8.6–10.5)
CALCIUM SPEC-SCNC: 8.3 MG/DL (ref 8.6–10.5)
CHLORIDE SERPL-SCNC: 110 MMOL/L (ref 98–107)
CHLORIDE SERPL-SCNC: 110 MMOL/L (ref 98–107)
CO2 SERPL-SCNC: 21 MMOL/L (ref 22–29)
CO2 SERPL-SCNC: 21.8 MMOL/L (ref 22–29)
CREAT SERPL-MCNC: 0.54 MG/DL (ref 0.57–1)
CREAT SERPL-MCNC: 0.68 MG/DL (ref 0.57–1)
EGFRCR SERPLBLD CKD-EPI 2021: 103 ML/MIN/1.73
EGFRCR SERPLBLD CKD-EPI 2021: 97.4 ML/MIN/1.73
GEN 5 2HR TROPONIN T REFLEX: 12 NG/L
GLUCOSE BLDC GLUCOMTR-MCNC: 124 MG/DL (ref 70–130)
GLUCOSE BLDC GLUCOMTR-MCNC: 126 MG/DL (ref 70–130)
GLUCOSE BLDC GLUCOMTR-MCNC: 174 MG/DL (ref 70–130)
GLUCOSE BLDC GLUCOMTR-MCNC: 224 MG/DL (ref 70–130)
GLUCOSE BLDC GLUCOMTR-MCNC: 252 MG/DL (ref 70–130)
GLUCOSE SERPL-MCNC: 199 MG/DL (ref 65–99)
GLUCOSE SERPL-MCNC: 243 MG/DL (ref 65–99)
POTASSIUM SERPL-SCNC: 4.2 MMOL/L (ref 3.5–5.2)
POTASSIUM SERPL-SCNC: 4.5 MMOL/L (ref 3.5–5.2)
QT INTERVAL: 353 MS
QTC INTERVAL: 413 MS
SODIUM SERPL-SCNC: 139 MMOL/L (ref 136–145)
SODIUM SERPL-SCNC: 141 MMOL/L (ref 136–145)
TROPONIN T DELTA: 3 NG/L
TROPONIN T SERPL HS-MCNC: 9 NG/L

## 2023-12-26 PROCEDURE — 63710000001 INSULIN LISPRO (HUMAN) PER 5 UNITS

## 2023-12-26 PROCEDURE — 63710000001 INSULIN GLARGINE PER 5 UNITS: Performed by: INTERNAL MEDICINE

## 2023-12-26 PROCEDURE — 93010 ELECTROCARDIOGRAM REPORT: CPT | Performed by: INTERNAL MEDICINE

## 2023-12-26 PROCEDURE — 84484 ASSAY OF TROPONIN QUANT: CPT | Performed by: HOSPITALIST

## 2023-12-26 PROCEDURE — 71250 CT THORAX DX C-: CPT

## 2023-12-26 PROCEDURE — 82948 REAGENT STRIP/BLOOD GLUCOSE: CPT

## 2023-12-26 PROCEDURE — 80048 BASIC METABOLIC PNL TOTAL CA: CPT | Performed by: HOSPITALIST

## 2023-12-26 PROCEDURE — 93005 ELECTROCARDIOGRAM TRACING: CPT | Performed by: HOSPITALIST

## 2023-12-26 RX ORDER — SUCRALFATE 1 G/1
1 TABLET ORAL
Status: DISCONTINUED | OUTPATIENT
Start: 2023-12-26 | End: 2023-12-29 | Stop reason: HOSPADM

## 2023-12-26 RX ORDER — PANTOPRAZOLE SODIUM 40 MG/10ML
40 INJECTION, POWDER, LYOPHILIZED, FOR SOLUTION INTRAVENOUS
Status: DISCONTINUED | OUTPATIENT
Start: 2023-12-26 | End: 2023-12-29 | Stop reason: HOSPADM

## 2023-12-26 RX ORDER — POTASSIUM CHLORIDE 750 MG/1
40 TABLET, FILM COATED, EXTENDED RELEASE ORAL EVERY 4 HOURS
Status: COMPLETED | OUTPATIENT
Start: 2023-12-26 | End: 2023-12-26

## 2023-12-26 RX ORDER — POLYETHYLENE GLYCOL 3350 17 G/17G
17 POWDER, FOR SOLUTION ORAL DAILY
Status: DISCONTINUED | OUTPATIENT
Start: 2023-12-26 | End: 2023-12-29 | Stop reason: HOSPADM

## 2023-12-26 RX ORDER — INSULIN DETEMIR 100 [IU]/ML
20 INJECTION, SOLUTION SUBCUTANEOUS 2 TIMES DAILY
Qty: 15 ML | Refills: 0 | Status: SHIPPED | OUTPATIENT
Start: 2023-12-26 | End: 2023-12-29 | Stop reason: SDUPTHER

## 2023-12-26 RX ORDER — SODIUM CHLORIDE 9 MG/ML
100 INJECTION, SOLUTION INTRAVENOUS CONTINUOUS
Status: DISCONTINUED | OUTPATIENT
Start: 2023-12-27 | End: 2023-12-28

## 2023-12-26 RX ORDER — INSULIN ASPART 100 [IU]/ML
8 INJECTION, SOLUTION INTRAVENOUS; SUBCUTANEOUS
Qty: 15 ML | Refills: 0 | Status: SHIPPED | OUTPATIENT
Start: 2023-12-26 | End: 2023-12-29 | Stop reason: HOSPADM

## 2023-12-26 RX ORDER — DOCOSANOL 100 MG/G
1 CREAM TOPICAL
Status: DISCONTINUED | OUTPATIENT
Start: 2023-12-26 | End: 2023-12-29 | Stop reason: HOSPADM

## 2023-12-26 RX ADMIN — INSULIN GLARGINE 20 UNITS: 100 INJECTION, SOLUTION SUBCUTANEOUS at 09:04

## 2023-12-26 RX ADMIN — POLYETHYLENE GLYCOL 3350 17 G: 17 POWDER, FOR SOLUTION ORAL at 11:12

## 2023-12-26 RX ADMIN — Medication 10 ML: at 09:04

## 2023-12-26 RX ADMIN — SUCRALFATE 1 G: 1 TABLET ORAL at 13:35

## 2023-12-26 RX ADMIN — INSULIN GLARGINE 20 UNITS: 100 INJECTION, SOLUTION SUBCUTANEOUS at 20:42

## 2023-12-26 RX ADMIN — ANTACID TABLETS 2 TABLET: 500 TABLET, CHEWABLE ORAL at 06:01

## 2023-12-26 RX ADMIN — PANTOPRAZOLE SODIUM 40 MG: 40 INJECTION, POWDER, FOR SOLUTION INTRAVENOUS at 11:12

## 2023-12-26 RX ADMIN — INSULIN LISPRO 8 UNITS: 100 INJECTION, SOLUTION INTRAVENOUS; SUBCUTANEOUS at 16:59

## 2023-12-26 RX ADMIN — DOCOSANOL 1 APPLICATION: 100 CREAM TOPICAL at 16:59

## 2023-12-26 RX ADMIN — POTASSIUM CHLORIDE 40 MEQ: 750 TABLET, EXTENDED RELEASE ORAL at 05:57

## 2023-12-26 RX ADMIN — DOCOSANOL 1 APPLICATION: 100 CREAM TOPICAL at 13:35

## 2023-12-26 RX ADMIN — POTASSIUM CHLORIDE 40 MEQ: 750 TABLET, EXTENDED RELEASE ORAL at 00:39

## 2023-12-26 RX ADMIN — PANTOPRAZOLE SODIUM 40 MG: 40 INJECTION, POWDER, FOR SOLUTION INTRAVENOUS at 16:59

## 2023-12-26 RX ADMIN — Medication 10 ML: at 20:42

## 2023-12-26 RX ADMIN — SUCRALFATE 1 G: 1 TABLET ORAL at 16:58

## 2023-12-26 RX ADMIN — SUCRALFATE 1 G: 1 TABLET ORAL at 20:42

## 2023-12-26 RX ADMIN — INSULIN LISPRO 4 UNITS: 100 INJECTION, SOLUTION INTRAVENOUS; SUBCUTANEOUS at 20:42

## 2023-12-26 RX ADMIN — INSULIN LISPRO 12 UNITS: 100 INJECTION, SOLUTION INTRAVENOUS; SUBCUTANEOUS at 11:12

## 2023-12-26 NOTE — CONSULTS
Diabetes Education  Assessment/Teaching    Patient Name:  Marnie Middleton  YOB: 1960  MRN: [id number]  Admit Date:  12/21/2023      Assessment Date:  12/26/2023    Flowsheet Row Most Recent Value   DM Education Needs    Meter Meter provided   Meter Type Contour  [Next with 30 sample test strips and lancets.  [employer] brochure provided for low cost options.]   Frequency of Testing AC meals   Blood Glucose Target Range  mg/dL premeal   Medication Insulin, Actions, Side effects, Administration, Pen  [Basal bolus concept and sliding scale with Levemir Flexpen and Novolog Flexpen 4 mm Claudia pen needles.  Pt qualilfies for a box each per [facility] pharmacist.  Both boxes should last a little over one month until pt is able to get back to [place].]   Problem Solving Hypoglycemia, Signs, Symptoms, Hyperglycemia, Treatment   Reducing Risks A1C testing  [A1c 14.7%]   Healthy Eating RD consult   Physical Activity Frequency Discussed exercise importance  [To start slowly at 10 minutes daily to get to goal of 30 minutes minimum daily]   Healthy Coping Appropriate   Discharge Plan Home   Motivation Engaged, Strong   Teaching Method Teach back, Handouts, Demonstration, Discussion, Explanation   Patient Response Demonstrates adequately, Verbalized understanding       Other Comments:  Encouraged outpatient DSMES and MNT when pt gets back to [place].  Have asked nursing to allow pt to perform BGM and insulin administration with supervision for practicing prior to discharge.      Electronically signed by:  Rody Meeks RN, [facility]  12/26/23 14:22 EST

## 2023-12-26 NOTE — CONSULTS
"Diabetes Education  Assessment/Teaching    Patient Name:  Marnie Middleton  YOB: 1960  MRN: [id number]  Admit Date:  12/21/2023      Assessment Date:  12/26/2023  Flowsheet Row Most Recent Value   General Information     Referral From: MD order   Height 149.9 cm (59.02\")   Height Method Stated   Weight 74.3 kg (163 lb 12.8 oz)   Weight Method Bed scale   Diabetes History    What type of diabetes do you have? Unknown  [Antibodies pending.  Pt states she has a family history of DM.]   Length of Diabetes Diagnosis Newly diagnosed <6 months   Education Preferences    Barriers to Learning other (comment)  [None noted.  Noted pt does not have health insurance or a current PCP she follows.]   Assessment Topics    Healthy Eating - Assessment Needs education   Being Active - Assessment Needs education   Taking Medication - Assessment Needs education   Problem Solving - Assessment Needs education   Reducing Risk - Assessment Needs education   Monitoring - Assessment Needs education   DM Goals    Healthy Eating - Goal 0-30 days from discharge   Being Active - Goal 0-30 days from discharge   Taking Medication - Goal 0-7 days from discharge   Problem Solving - Goal 0-30 days from discharge   Reducing Risk - Goal 30-90 days from discharge   Monitoring - Goal 0-7 days from discharge   Contact Plan Follow-up medical care       Flowsheet Row Most Recent Value   DM Education Needs    Frequency of Testing AC meals   Blood Glucose Target Range  mg/dL premeal   Problem Solving Hypoglycemia, Signs, Symptoms, Hyperglycemia, Treatment   Reducing Risks A1C testing  [A1c 14.7%]   Healthy Eating RD consult   Healthy Coping Appropriate   Discharge Plan Home, Follow-up with PCP  [Pt states will be returning to [place] and looking into applying for health insurance coverage.  Encouraged to find a new PCP for follow up care.]   Motivation Engaged, Strong   Teaching Method Handouts, Discussion, Explanation   Patient Response Needs " reinforcement     Lunch trays arriving soon.  Will follow up with BGM and insulin teaching.    Electronically signed by:  Rody Meeks RN, [facility]  12/26/23 11:49 EST

## 2023-12-26 NOTE — PROGRESS NOTES
[facility]     LOS: 5 [facility]     Subjective:    CC:Altered Mental Status    DIET:  Diet Order   Procedures    Diet: Diabetic Diets; Consistent Carbohydrate; Texture: Regular Texture (IDDSI 7); Fluid Consistency: Thin (IDDSI 0)     Appetite still poor but better, still with epigastric pain  Objective:    Vital Signs:  Temp:  [98.2 °F (36.8 °C)-98.6 °F (37 °C)] 98.6 °F (37 °C)  Heart Rate:  [80-84] 84  Resp:  [18] 18  BP: (110-122)/(58-68) 110/58    SpO2:  [97 %-98 %] 98 %  on   ;   Device (Oxygen Therapy): room air  Body mass index is 33.07 kg/m².    Physical Exam  Constitutional:       Appearance: Normal appearance.   HENT:      Head: Normocephalic and atraumatic.   Cardiovascular:      Rate and Rhythm: Normal rate and regular rhythm.      Heart sounds: No murmur heard.     No friction rub.   Pulmonary:      Effort: Pulmonary effort is normal.      Breath sounds: Normal breath sounds.   Abdominal:      General: Bowel sounds are normal. There is no distension.      Palpations: Abdomen is soft.      Tenderness: There is no abdominal tenderness.   Skin:     General: Skin is warm and dry.   Neurological:      Mental Status: She is alert.   Psychiatric:         Mood and Affect: Mood normal.         Behavior: Behavior normal.         Results Review:    Glucose   Date Value Ref Range Status   12/26/2023 199 (H) 65 - 99 mg/dL Final   12/25/2023 174 (H) 65 - 99 mg/dL Final   12/25/2023 202 (H) 65 - 99 mg/dL Final   12/25/2023 241 (H) 65 - 99 mg/dL Final   12/25/2023 234 (H) 65 - 99 mg/dL Final   12/24/2023 237 (H) 65 - 99 mg/dL Final     Results from last 7 days   Lab Units 12/25/23  2248   WBC 10*3/mm3 8.78   HEMOGLOBIN g/dL 10.8*   HEMATOCRIT % 32.0*   PLATELETS 10*3/mm3 56*     Results from last 7 days   Lab Units 12/26/23  0916 12/23/23  1957 12/23/23  1918   SODIUM mmol/L 139   < > 152*  152*   POTASSIUM mmol/L 4.5   < > 3.6  3.6   CHLORIDE mmol/L 110*   < > 124*  124*   CO2 mmol/L 21.0*    < > 20.0*  20.0*   BUN mg/dL 6*   < > 9  9   CREATININE mg/dL 0.54*   < > 0.69  0.69   CALCIUM mg/dL 7.9*   < > 6.9*  6.9*   BILIRUBIN mg/dL  --   --  0.5   ALK PHOS U/L  --   --  98   ALT (SGPT) U/L  --   --  23   AST (SGOT) U/L  --   --  29   GLUCOSE mg/dL 199*   < > 135*  135*    < > = values in this interval not displayed.         Results from last 7 days   Lab Units 12/24/23  0321   MAGNESIUM mg/dL 1.8     Results from last 7 days   Lab Units 12/22/23  0125 12/21/23  2202   HSTROP T ng/L 11 13     Cultures:  Blood Culture   Date Value Ref Range Status   12/22/2023 No growth at 3 days  Preliminary   12/21/2023 No growth at 3 days  Preliminary       I have reviewed daily medications and changes in CPOE    Scheduled meds  insulin glargine, 20 Units, Subcutaneous, Q12H  insulin lispro, 4-24 Units, Subcutaneous, Q4H  pantoprazole, 40 mg, Intravenous, BID AC  polyethylene glycol, 17 g, Oral, Daily  sodium chloride, 10 mL, Intravenous, Q12H  sucralfate, 1 g, Oral, 4x Daily AC & at Bedtime        dextrose 5 % and sodium chloride 0.45 %, 150 mL/hr  dextrose 5 % and sodium chloride 0.45 % with KCl 20 mEq/L, 150 mL/hr  dextrose 5 % and sodium chloride 0.45 % with KCl 40 mEq/L, 150 mL/hr  dextrose 5 % and sodium chloride 0.9 %, 150 mL/hr  dextrose 5 % and sodium chloride 0.9 % with KCl 20 mEq, 150 mL/hr  dextrose 5% and sodium chloride 0.9% with KCl 40 mEq/L, 150 mL/hr  insulin, 0-100 Units/hr, Last Rate: Stopped (12/23/23 1943)  sodium chloride 0.45 % 1,000 mL with potassium chloride 40 mEq infusion, 250 mL/hr  sodium chloride, 250 mL/hr  sodium chloride 0.45 % with KCl 20 mEq, 250 mL/hr, Last Rate: Stopped (12/22/23 1415)  sodium chloride, 250 mL/hr  sodium chloride 0.9 % with KCl 20 mEq, 250 mL/hr  sodium chloride 0.9 % with KCl 40 mEq/L, 250 mL/hr      PRN meds    calcium carbonate    Calcium Replacement - Follow Nurse / BPA Driven Protocol    dextrose    dextrose    dextrose    dextrose    dextrose 5 % and sodium  chloride 0.45 %    dextrose 5 % and sodium chloride 0.45 % with KCl 20 mEq/L    dextrose 5 % and sodium chloride 0.45 % with KCl 40 mEq/L    dextrose 5 % and sodium chloride 0.9 %    dextrose 5 % and sodium chloride 0.9 % with KCl 20 mEq    dextrose 5% and sodium chloride 0.9% with KCl 40 mEq/L    glucagon (human recombinant)    glucagon (human recombinant)    Magnesium Standard Dose Replacement - Follow Nurse / BPA Driven Protocol    ondansetron    oxyCODONE    Phosphorus Replacement - Follow Nurse / BPA Driven Protocol    Potassium Replacement - Follow Nurse / BPA Driven Protocol    prochlorperazine    sodium chloride 0.45 % 1,000 mL with potassium chloride 40 mEq infusion    sodium chloride    sodium chloride 0.45 % with KCl 20 mEq    [COMPLETED] Insert Peripheral IV **AND** sodium chloride    sodium chloride    sodium chloride    sodium chloride    sodium chloride 0.9 % with KCl 20 mEq    sodium chloride 0.9 % with KCl 40 mEq/L        DKA (diabetic ketoacidosis)    Hypernatremia    Leukocytosis    New onset type 2 diabetes mellitus    Dehydration    Abnormal CXR        Assessment/Plan:  Epigastric pain- troponin, EKG, ct of the chest, carafate to see if this helps, protonix IV    New onset DM2  Diabetic ketoacidosis, resolved  -A1c 14.7  -Glargine 20 units twice daily, SSI; will monitor and adjust based off needs  -Diabetic educator to see  -Will need close follow-up with PCP postdischarge     Hypernatremia  ARF, improved  Dehydration  -related to above  -d/c fluids     Leukocytosis  -Suspect reactive     Left basilar consolidation  -chest ct     Diabetes educator  Will check antibodies -quintin    Yfn Adhikari MD  12/26/23  10:19 EST

## 2023-12-26 NOTE — PLAN OF CARE
Problem: Adult Inpatient Plan of Care  Goal: Plan of Care Review  Outcome: Ongoing, Progressing  Flowsheets (Taken 12/26/2023 0546)  Progress: improving  Plan of Care Reviewed With: patient  Outcome Evaluation:   VSS   on RA   SR on monitor   K+ replaced, recheck in AM   sugars 250, 124,   meds crushed in pudding   p/t c/o  heartburn overnight -tums given PRN   will cont to monitor   Goal Outcome Evaluation:  Plan of Care Reviewed With: patient        Progress: improving  Outcome Evaluation: VSS; on RA; SR on monitor; K+ replaced, recheck in AM; sugars 250, 124, ; meds crushed in pudding; p/t c/o  heartburn overnight -tums given PRN; will cont to monitor

## 2023-12-26 NOTE — PLAN OF CARE
Goal Outcome Evaluation:  Plan of Care Reviewed With: patient, friend      Outcome Evaluation: Orders received for swallow evaluation on 12/22/23. Patient not appropriate for evaluation 12/22/23 or 12/23/23 due to nausea and vomiting. MD initiated a regular diet with thin liquids on 12/24/23. RN and patient report tolerance of diet. Patient prefers medications crushed in puree/pudding. She reports a history of esophageal dysphagia requiring dilation every couple of years with last dilation about 2 years ago, reflux, and hiatal hernia. Patient reports indigestion and solids sticking with localization to the epigastric region. Discussed and offered clinical swallow evaluation and patient/SLP agree to hold evaluation at this time due to suspected esophageal dysphagia. Consider GI consult. Please re-consult SLP as indicated. Patient in agreement.

## 2023-12-27 ENCOUNTER — APPOINTMENT (OUTPATIENT)
Dept: ULTRASOUND IMAGING | Facility: HOSPITAL | Age: 63
End: 2023-12-27

## 2023-12-27 PROBLEM — R13.10 DYSPHAGIA: Status: ACTIVE | Noted: 2023-12-21

## 2023-12-27 LAB
ALBUMIN SERPL-MCNC: 2.8 G/DL (ref 3.5–5.2)
ALBUMIN/GLOB SERPL: 1.2 G/DL
ALP SERPL-CCNC: 137 U/L (ref 39–117)
ALT SERPL W P-5'-P-CCNC: 20 U/L (ref 1–33)
ANION GAP SERPL CALCULATED.3IONS-SCNC: 8 MMOL/L (ref 5–15)
ANION GAP SERPL CALCULATED.3IONS-SCNC: 8.4 MMOL/L (ref 5–15)
AST SERPL-CCNC: 16 U/L (ref 1–32)
BACTERIA SPEC AEROBE CULT: NORMAL
BASOPHILS # BLD AUTO: 0.02 10*3/MM3 (ref 0–0.2)
BASOPHILS NFR BLD AUTO: 0.2 % (ref 0–1.5)
BILIRUB SERPL-MCNC: 0.5 MG/DL (ref 0–1.2)
BUN SERPL-MCNC: 6 MG/DL (ref 8–23)
BUN SERPL-MCNC: 8 MG/DL (ref 8–23)
BUN/CREAT SERPL: 10 (ref 7–25)
BUN/CREAT SERPL: 12.1 (ref 7–25)
CALCIUM SPEC-SCNC: 7.9 MG/DL (ref 8.6–10.5)
CALCIUM SPEC-SCNC: 8 MG/DL (ref 8.6–10.5)
CHLORIDE SERPL-SCNC: 110 MMOL/L (ref 98–107)
CHLORIDE SERPL-SCNC: 111 MMOL/L (ref 98–107)
CHOLEST SERPL-MCNC: 118 MG/DL (ref 0–200)
CO2 SERPL-SCNC: 22 MMOL/L (ref 22–29)
CO2 SERPL-SCNC: 23.6 MMOL/L (ref 22–29)
CREAT SERPL-MCNC: 0.6 MG/DL (ref 0.57–1)
CREAT SERPL-MCNC: 0.66 MG/DL (ref 0.57–1)
DEPRECATED RDW RBC AUTO: 40.9 FL (ref 37–54)
EGFRCR SERPLBLD CKD-EPI 2021: 100.4 ML/MIN/1.73
EGFRCR SERPLBLD CKD-EPI 2021: 98.1 ML/MIN/1.73
EOSINOPHIL # BLD AUTO: 0.25 10*3/MM3 (ref 0–0.4)
EOSINOPHIL NFR BLD AUTO: 2.5 % (ref 0.3–6.2)
ERYTHROCYTE [DISTWIDTH] IN BLOOD BY AUTOMATED COUNT: 12.6 % (ref 12.3–15.4)
GLOBULIN UR ELPH-MCNC: 2.4 GM/DL
GLUCOSE BLDC GLUCOMTR-MCNC: 166 MG/DL (ref 70–130)
GLUCOSE BLDC GLUCOMTR-MCNC: 184 MG/DL (ref 70–130)
GLUCOSE BLDC GLUCOMTR-MCNC: 292 MG/DL (ref 70–130)
GLUCOSE BLDC GLUCOMTR-MCNC: 89 MG/DL (ref 70–130)
GLUCOSE BLDC GLUCOMTR-MCNC: 94 MG/DL (ref 70–130)
GLUCOSE BLDC GLUCOMTR-MCNC: >599 MG/DL (ref 70–130)
GLUCOSE SERPL-MCNC: 114 MG/DL (ref 65–99)
GLUCOSE SERPL-MCNC: 99 MG/DL (ref 65–99)
HCT VFR BLD AUTO: 30.7 % (ref 34–46.6)
HDLC SERPL-MCNC: 25 MG/DL (ref 40–60)
HGB BLD-MCNC: 10.2 G/DL (ref 12–15.9)
IMM GRANULOCYTES # BLD AUTO: 0.06 10*3/MM3 (ref 0–0.05)
IMM GRANULOCYTES NFR BLD AUTO: 0.6 % (ref 0–0.5)
LDLC SERPL CALC-MCNC: 72 MG/DL (ref 0–100)
LDLC/HDLC SERPL: 2.82 {RATIO}
LIPASE SERPL-CCNC: 136 U/L (ref 13–60)
LYMPHOCYTES # BLD AUTO: 2.47 10*3/MM3 (ref 0.7–3.1)
LYMPHOCYTES NFR BLD AUTO: 25.2 % (ref 19.6–45.3)
MCH RBC QN AUTO: 30.1 PG (ref 26.6–33)
MCHC RBC AUTO-ENTMCNC: 33.2 G/DL (ref 31.5–35.7)
MCV RBC AUTO: 90.6 FL (ref 79–97)
MONOCYTES # BLD AUTO: 0.98 10*3/MM3 (ref 0.1–0.9)
MONOCYTES NFR BLD AUTO: 10 % (ref 5–12)
NEUTROPHILS NFR BLD AUTO: 6.03 10*3/MM3 (ref 1.7–7)
NEUTROPHILS NFR BLD AUTO: 61.5 % (ref 42.7–76)
NRBC BLD AUTO-RTO: 0 /100 WBC (ref 0–0.2)
PLATELET # BLD AUTO: 100 10*3/MM3 (ref 140–450)
PMV BLD AUTO: 12.4 FL (ref 6–12)
POTASSIUM SERPL-SCNC: 3.7 MMOL/L (ref 3.5–5.2)
POTASSIUM SERPL-SCNC: 3.7 MMOL/L (ref 3.5–5.2)
PROT SERPL-MCNC: 5.2 G/DL (ref 6–8.5)
RBC # BLD AUTO: 3.39 10*6/MM3 (ref 3.77–5.28)
SODIUM SERPL-SCNC: 141 MMOL/L (ref 136–145)
SODIUM SERPL-SCNC: 142 MMOL/L (ref 136–145)
TRIGL SERPL-MCNC: 113 MG/DL (ref 0–150)
VLDLC SERPL-MCNC: 21 MG/DL (ref 5–40)
WBC NRBC COR # BLD AUTO: 9.81 10*3/MM3 (ref 3.4–10.8)

## 2023-12-27 PROCEDURE — 99254 IP/OBS CNSLTJ NEW/EST MOD 60: CPT | Performed by: PHYSICIAN ASSISTANT

## 2023-12-27 PROCEDURE — 85025 COMPLETE CBC W/AUTO DIFF WBC: CPT | Performed by: HOSPITALIST

## 2023-12-27 PROCEDURE — 97530 THERAPEUTIC ACTIVITIES: CPT | Performed by: PHYSICAL THERAPIST

## 2023-12-27 PROCEDURE — 97162 PT EVAL MOD COMPLEX 30 MIN: CPT | Performed by: PHYSICAL THERAPIST

## 2023-12-27 PROCEDURE — 63710000001 INSULIN GLARGINE PER 5 UNITS: Performed by: INTERNAL MEDICINE

## 2023-12-27 PROCEDURE — 80061 LIPID PANEL: CPT | Performed by: HOSPITALIST

## 2023-12-27 PROCEDURE — 83690 ASSAY OF LIPASE: CPT | Performed by: HOSPITALIST

## 2023-12-27 PROCEDURE — 76705 ECHO EXAM OF ABDOMEN: CPT

## 2023-12-27 PROCEDURE — 82948 REAGENT STRIP/BLOOD GLUCOSE: CPT

## 2023-12-27 PROCEDURE — 63710000001 INSULIN LISPRO (HUMAN) PER 5 UNITS

## 2023-12-27 PROCEDURE — 80053 COMPREHEN METABOLIC PANEL: CPT | Performed by: HOSPITALIST

## 2023-12-27 PROCEDURE — 25810000003 SODIUM CHLORIDE 0.9 % SOLUTION: Performed by: HOSPITALIST

## 2023-12-27 RX ADMIN — SUCRALFATE 1 G: 1 TABLET ORAL at 08:58

## 2023-12-27 RX ADMIN — DOCOSANOL 1 APPLICATION: 100 CREAM TOPICAL at 21:47

## 2023-12-27 RX ADMIN — INSULIN GLARGINE 20 UNITS: 100 INJECTION, SOLUTION SUBCUTANEOUS at 21:42

## 2023-12-27 RX ADMIN — SUCRALFATE 1 G: 1 TABLET ORAL at 21:42

## 2023-12-27 RX ADMIN — PANTOPRAZOLE SODIUM 40 MG: 40 INJECTION, POWDER, FOR SOLUTION INTRAVENOUS at 17:11

## 2023-12-27 RX ADMIN — INSULIN LISPRO 4 UNITS: 100 INJECTION, SOLUTION INTRAVENOUS; SUBCUTANEOUS at 21:42

## 2023-12-27 RX ADMIN — INSULIN LISPRO 4 UNITS: 100 INJECTION, SOLUTION INTRAVENOUS; SUBCUTANEOUS at 17:11

## 2023-12-27 RX ADMIN — INSULIN LISPRO 12 UNITS: 100 INJECTION, SOLUTION INTRAVENOUS; SUBCUTANEOUS at 13:08

## 2023-12-27 RX ADMIN — SODIUM CHLORIDE 100 ML/HR: 9 INJECTION, SOLUTION INTRAVENOUS at 21:42

## 2023-12-27 RX ADMIN — DOCOSANOL 1 APPLICATION: 100 CREAM TOPICAL at 13:08

## 2023-12-27 RX ADMIN — DOCOSANOL 1 APPLICATION: 100 CREAM TOPICAL at 17:11

## 2023-12-27 RX ADMIN — POLYETHYLENE GLYCOL 3350 17 G: 17 POWDER, FOR SOLUTION ORAL at 08:59

## 2023-12-27 RX ADMIN — SUCRALFATE 1 G: 1 TABLET ORAL at 13:07

## 2023-12-27 RX ADMIN — INSULIN GLARGINE 20 UNITS: 100 INJECTION, SOLUTION SUBCUTANEOUS at 08:58

## 2023-12-27 RX ADMIN — SODIUM CHLORIDE 100 ML/HR: 9 INJECTION, SOLUTION INTRAVENOUS at 00:31

## 2023-12-27 RX ADMIN — SUCRALFATE 1 G: 1 TABLET ORAL at 17:11

## 2023-12-27 RX ADMIN — Medication 10 ML: at 21:47

## 2023-12-27 RX ADMIN — PANTOPRAZOLE SODIUM 40 MG: 40 INJECTION, POWDER, FOR SOLUTION INTRAVENOUS at 08:58

## 2023-12-27 RX ADMIN — Medication 10 ML: at 08:58

## 2023-12-27 NOTE — THERAPY EVALUATION
Patient Name: Marnie Middleton  : 1960    MRN: [id number]                              Today's Date: 2023       Admit Date: 2023    Visit Dx:     ICD-10-CM ICD-9-CM   1. Sepsis with encephalopathy without septic shock, due to unspecified organism  A41.9 038.9    R65.20 995.92    G93.41 348.31   2. Diabetic ketoacidosis with coma associated with type 2 diabetes mellitus  E11.11 250.32     Patient Active Problem List   Diagnosis    DKA (diabetic ketoacidosis)    Hypernatremia    Leukocytosis    New onset type 2 diabetes mellitus    Dehydration    Abnormal CXR     History reviewed. No pertinent past medical history.  History reviewed. No pertinent surgical history.   General Information       Row Name 23 1302          Physical Therapy Time and Intention    Document Type evaluation  -     Mode of Treatment individual therapy;physical therapy  -       Row Name 23 1302          General Information    Patient Profile Reviewed yes  -     Prior Level of Function independent:  -     Existing Precautions/Restrictions fall  -     Barriers to Rehab none identified  -       Row Name 23 1302          Living Environment    People in Home alone  -       Row Name 23 1302          Home Main Entrance    Number of Stairs, Main Entrance --  staying with friend, reports no stairs at back of friends house  -       Row Name 23 1302          Cognition    Orientation Status (Cognition) oriented x 3  -               User Key  (r) = Recorded By, (t) = Taken By, (c) = Cosigned By      Initials Name Provider Type     Evelin Camacho, PT Physical Therapist                   Mobility       Row Name 23 1303          Bed Mobility    Bed Mobility supine-sit;sit-supine  -     Supine-Sit [place] (Bed Mobility) standby assist  -     Sit-Supine [place] (Bed Mobility) standby assist  -     Assistive Device (Bed Mobility) bed rails  -       Row Name 23  1303          Sit-Stand Transfer    Sit-Stand [place] (Transfers) contact guard  -       Row Name 12/27/23 1303          Gait/Stairs (Locomotion)    [place] Level (Gait) contact guard;minimum assist (75% patient effort)  -     Distance in Feet (Gait) 125  -     Deviations/Abnormal Patterns (Gait) stride length decreased;gait speed decreased  -     Comment, (Gait/Stairs) unsteady, several LOB and lateral path deviations to L with min A to recover  -               User Key  (r) = Recorded By, (t) = Taken By, (c) = Cosigned By      Initials Name Provider Type    Evelin Mendez, PT Physical Therapist                   Obj/Interventions       Row Name 12/27/23 1304          Range of Motion Comprehensive    Comment, General Range of Motion BLE WFL  -       Row Name 12/27/23 1304          Strength Comprehensive (MMT)    Comment, General Manual Muscle Testing (MMT) Assessment generalized weakness, functionall at least 3/5  -[facility] Name 12/27/23 1304          Motor Skills    Therapeutic Exercise --  BLE AP, LAQ, seated marching x 10 reps  -       Row Name 12/27/23 1304          Balance    Balance Assessment sitting static balance;sitting dynamic balance;standing static balance;standing dynamic balance  -     Static Sitting Balance independent  -     Dynamic Sitting Balance supervision  -     Position, Sitting Balance unsupported;sitting edge of bed  -     Static Standing Balance contact guard  -     Dynamic Standing Balance contact guard  -               User Key  (r) = Recorded By, (t) = Taken By, (c) = Cosigned By      Initials Name Provider Type    Evelin Mendez, PT Physical Therapist                   Goals/Plan       Row Name 12/27/23 1322          Bed Mobility Goal 1 (PT)    Activity/Assistive Device (Bed Mobility Goal 1, PT) bed mobility activities, all  -     [place] Level/Cues Needed (Bed Mobility Goal 1, PT) independent  -     Time Frame  (Bed Mobility Goal 1, PT) 1 week  -       Row Name 12/27/23 1322          Transfer Goal 1 (PT)    Activity/Assistive Device (Transfer Goal 1, PT) transfers, all  -     [place] Level/Cues Needed (Transfer Goal 1, PT) standby assist  -KH     Time Frame (Transfer Goal 1, PT) 1 week  -       Row Name 12/27/23 1322          Gait Training Goal 1 (PT)    Activity/Assistive Device (Gait Training Goal 1, PT) gait (walking locomotion)  -KH     [place] Level (Gait Training Goal 1, PT) standby assist  -KH     Distance (Gait Training Goal 1, PT) 150ft  -KH     Time Frame (Gait Training Goal 1, PT) 1 week  -       Row Name 12/27/23 1322          Patient Education Goal (PT)    Activity (Patient Education Goal, PT) HEP  -     [place]/Cues/Accuracy (Memory Goal 2, PT) demonstrates adequately  -KH     Time Frame (Patient Education Goal, PT) 1 week  -       Row Name 12/27/23 1322          Therapy Assessment/Plan (PT)    Planned Therapy Interventions (PT) balance training;bed mobility training;gait training;home exercise program;strengthening;transfer training;patient/family education  -               User Key  (r) = Recorded By, (t) = Taken By, (c) = Cosigned By      Initials Name Provider Type    Evelin Mendez, PT Physical Therapist                   Clinical Impression       Row Name 12/27/23 1305          Pain    Pretreatment Pain Rating 0/10 - no pain  -     Posttreatment Pain Rating 0/10 - no pain  -       Row Name 12/27/23 1305          Plan of Care Review    Plan of Care Reviewed With patient  Counts include [facility] beds at the [facility]     Outcome Evaluation Pt was found unresponsive at [facility] and admitted with DKA. Pt was visiting from out of town. She normally lives alone and is independently mobile with no assistive device. Pt presents with generalized weakness, impaired balance and unsteady gait. Pt would benefit from PT to address these impairments. Plans to d/c home with friend for a few days then fly back  home. Pt needs to be up and moving more often than just with PT. Ecnouraged her to be up to chair for all meals and ambulate to bathroom and in hallways with nursing staff.  -       Row Name 12/27/23 1305          Therapy Assessment/Plan (PT)    Patient/Family Therapy Goals Statement (PT) return to PLOF  -     Rehab Potential (PT) good, to achieve stated therapy goals  -     Criteria for Skilled Interventions Met (PT) yes  -     Therapy Frequency (PT) 6 times/wk  -       Row Name 12/27/23 1305          Positioning and Restraints    Pre-Treatment Position in bed  -     Post Treatment Position chair  -     In Chair sitting;call light within reach;encouraged to call for assist;exit alarm on  -               User Key  (r) = Recorded By, (t) = Taken By, (c) = Cosigned By      Initials Name Provider Type    Evelin Mendez, PT Physical Therapist                   Outcome Measures       Row Name 12/27/23 1323 12/27/23 0850       How much help from another person do you currently need...    Turning from your back to your side while in flat bed without using bedrails? 4  -KH 4  -TH    Moving from lying on back to sitting on the side of a flat bed without bedrails? 4  -KH 4  -TH    Moving to and from a bed to a chair (including a wheelchair)? 3  -KH 3  -TH    Standing up from a chair using your arms (e.g., wheelchair, bedside chair)? 3  -KH 3  -TH    Climbing 3-5 steps with a railing? 3  -KH 2  -TH    To walk in hospital room? 3  -KH 3  -TH    AM-PAC 6 Clicks Score (PT) 20  -KH 19  -TH    Highest Level of Mobility Goal 6 --> Walk 10 steps or more  - 6 --> Walk 10 steps or more  -TH      Row Name 12/27/23 1323          Functional Assessment    Outcome Measure Options AM-PAC 6 Clicks Basic Mobility (PT)  -               User Key  (r) = Recorded By, (t) = Taken By, (c) = Cosigned By      Initials Name Provider Type    Evelin Mendez, PT Physical Therapist    Brittni Oneal RN  Registered Nurse                                 Physical Therapy Education       Title: PT OT SLP Therapies (Not Started)       Topic: Physical Therapy (Not Started)       Point: Mobility training (Not Started)       Learner Progress:  Not documented in this visit.              Point: Home exercise program (Not Started)       Learner Progress:  Not documented in this visit.              Point: Body mechanics (Not Started)       Learner Progress:  Not documented in this visit.              Point: Precautions (Not Started)       Learner Progress:  Not documented in this visit.                                  PT Recommendation and Plan  Planned Therapy Interventions (PT): balance training, bed mobility training, gait training, home exercise program, strengthening, transfer training, patient/family education  Plan of Care Reviewed With: patient  Outcome Evaluation: Pt was found unresponsive at [facility] and admitted with DKA. Pt was visiting from out of town. She normally lives alone and is independently mobile with no assistive device. Pt presents with generalized weakness, impaired balance and unsteady gait. Pt would benefit from PT to address these impairments. Plans to d/c home with friend for a few days then fly back home. Pt needs to be up and moving more often than just with PT. Ecnouraged her to be up to chair for all meals and ambulate to bathroom and in hallways with nursing staff.     Time Calculation:         PT Charges       Row Name 12/27/23 1324             Time Calculation    Start Time 1125  -      Stop Time 1144  -      Time Calculation (min) 19 min  -KH      PT Received On 12/27/23  -      PT - Next Appointment 12/28/23  -      PT Goal Re-Cert Due Date 01/03/24  -         Time Calculation- PT    Total Timed Code Minutes- PT 8 minute(s)  -         Timed Charges    95974 - PT Therapeutic Activity Minutes 8  -KH         Untimed Charges    PT Eval/Re-eval Minutes 11  -         Total  Minutes    Timed Charges Total Minutes 8  -KH      Untimed Charges Total Minutes 11  -KH       Total Minutes 19  -KH                User Key  (r) = Recorded By, (t) = Taken By, (c) = Cosigned By      Initials Name Provider Type    Evelin Menedz, PT Physical Therapist                  Therapy Charges for Today       Code Description Service Date Service Provider Modifiers Qty    96804789893 HC PT THERAPEUTIC ACT EA 15 MIN 12/27/2023 Evelin Camacho, PT GP 1    15345932705 HC PT EVAL MOD COMPLEXITY 3 12/27/2023 Evelin Camacho, PT GP 1            PT G-Codes  Outcome Measure Options: AM-PAC 6 Clicks Basic Mobility (PT)  AM-PAC 6 Clicks Score (PT): 20  PT Discharge Summary  Anticipated Discharge Disposition (PT): home with assist    Evelin Camacho, PT  12/27/2023

## 2023-12-27 NOTE — PROGRESS NOTES
[facility]     LOS: 6 days     Subjective:    CC:Altered Mental Status    DIET:  Diet Order   Procedures    NPO Diet NPO Type: Strict NPO    Diet: Gastrointestinal Diets, Diabetic Diets; Consistent Carbohydrate; Fiber-Restricted; Texture: Regular Texture (IDDSI 7); Fluid Consistency: Thin (IDDSI 0)     Appetite still poor but better, still with epigastric pain    Objective:    Vital Signs:  Temp:  [98.2 °F (36.8 °C)-99.1 °F (37.3 °C)] 98.2 °F (36.8 °C)  Heart Rate:  [81-91] 90  Resp:  [18] 18  BP: ()/(55-67) 98/62    SpO2:  [96 %-98 %] 97 %  on   ;   Device (Oxygen Therapy): room air  Body mass index is 30.87 kg/m².    Physical Exam  Constitutional:       Appearance: Normal appearance.   HENT:      Head: Normocephalic and atraumatic.   Cardiovascular:      Rate and Rhythm: Normal rate and regular rhythm.      Heart sounds: No murmur heard.     No friction rub.   Pulmonary:      Effort: Pulmonary effort is normal.      Breath sounds: Normal breath sounds.   Abdominal:      General: Bowel sounds are normal. There is no distension.      Palpations: Abdomen is soft.      Tenderness: There is abdominal tenderness.   Skin:     General: Skin is warm and dry.   Neurological:      Mental Status: She is alert.   Psychiatric:         Mood and Affect: Mood normal.         Behavior: Behavior normal.         Results Review:    Glucose   Date Value Ref Range Status   12/27/2023 114 (H) 65 - 99 mg/dL Final   12/26/2023 99 65 - 99 mg/dL Final   12/26/2023 243 (H) 65 - 99 mg/dL Final   12/26/2023 199 (H) 65 - 99 mg/dL Final   12/25/2023 174 (H) 65 - 99 mg/dL Final   12/25/2023 202 (H) 65 - 99 mg/dL Final     Results from last 7 days   Lab Units 12/27/23  0738   WBC 10*3/mm3 9.81   HEMOGLOBIN g/dL 10.2*   HEMATOCRIT % 30.7*   PLATELETS 10*3/mm3 100*     Results from last 7 days   Lab Units 12/27/23  0738   SODIUM mmol/L 141   POTASSIUM mmol/L 3.7   CHLORIDE mmol/L 111*   CO2 mmol/L 22.0   BUN mg/dL  6*   CREATININE mg/dL 0.60   CALCIUM mg/dL 7.9*   BILIRUBIN mg/dL 0.5   ALK PHOS U/L 137*   ALT (SGPT) U/L 20   AST (SGOT) U/L 16   GLUCOSE mg/dL 114*         Results from last 7 days   Lab Units 12/24/23  0321   MAGNESIUM mg/dL 1.8     Results from last 7 days   Lab Units 12/26/23  1127 12/26/23  0916 12/22/23  0125   HSTROP T ng/L 12 9 11     Cultures:  Blood Culture   Date Value Ref Range Status   12/22/2023 No growth at 3 days  Preliminary   12/21/2023 No growth at 3 days  Preliminary       I have reviewed daily medications and changes in CPOE    Scheduled meds  docosanol, 1 application , Topical, 5x Daily  insulin glargine, 20 Units, Subcutaneous, Q12H  insulin lispro, 4-24 Units, Subcutaneous, Q4H  pantoprazole, 40 mg, Intravenous, BID AC  polyethylene glycol, 17 g, Oral, Daily  sodium chloride, 10 mL, Intravenous, Q12H  sucralfate, 1 g, Oral, 4x Daily AC & at Bedtime        dextrose 5 % and sodium chloride 0.45 %, 150 mL/hr  dextrose 5 % and sodium chloride 0.45 % with KCl 20 mEq/L, 150 mL/hr  dextrose 5 % and sodium chloride 0.45 % with KCl 40 mEq/L, 150 mL/hr  dextrose 5 % and sodium chloride 0.9 %, 150 mL/hr  dextrose 5 % and sodium chloride 0.9 % with KCl 20 mEq, 150 mL/hr  dextrose 5% and sodium chloride 0.9% with KCl 40 mEq/L, 150 mL/hr  insulin, 0-100 Units/hr, Last Rate: Stopped (12/23/23 1943)  sodium chloride 0.45 % 1,000 mL with potassium chloride 40 mEq infusion, 250 mL/hr  sodium chloride, 250 mL/hr  sodium chloride 0.45 % with KCl 20 mEq, 250 mL/hr, Last Rate: Stopped (12/22/23 1415)  sodium chloride, 250 mL/hr  sodium chloride, 100 mL/hr, Last Rate: 100 mL/hr (12/27/23 0031)  sodium chloride 0.9 % with KCl 20 mEq, 250 mL/hr  sodium chloride 0.9 % with KCl 40 mEq/L, 250 mL/hr      PRN meds    calcium carbonate    Calcium Replacement - Follow Nurse / BPA Driven Protocol    dextrose    dextrose    dextrose    dextrose    dextrose 5 % and sodium chloride 0.45 %    dextrose 5 % and sodium chloride  0.45 % with KCl 20 mEq/L    dextrose 5 % and sodium chloride 0.45 % with KCl 40 mEq/L    dextrose 5 % and sodium chloride 0.9 %    dextrose 5 % and sodium chloride 0.9 % with KCl 20 mEq    dextrose 5% and sodium chloride 0.9% with KCl 40 mEq/L    glucagon (human recombinant)    glucagon (human recombinant)    Magnesium Standard Dose Replacement - Follow Nurse / BPA Driven Protocol    ondansetron    oxyCODONE    Phosphorus Replacement - Follow Nurse / BPA Driven Protocol    Potassium Replacement - Follow Nurse / BPA Driven Protocol    prochlorperazine    sodium chloride 0.45 % 1,000 mL with potassium chloride 40 mEq infusion    sodium chloride    sodium chloride 0.45 % with KCl 20 mEq    [COMPLETED] Insert Peripheral IV **AND** sodium chloride    sodium chloride    sodium chloride    sodium chloride    sodium chloride 0.9 % with KCl 20 mEq    sodium chloride 0.9 % with KCl 40 mEq/L        DKA (diabetic ketoacidosis)    Hypernatremia    Leukocytosis    New onset type 2 diabetes mellitus    Dehydration    Abnormal CXR    Dysphagia        Assessment/Plan:  Epigastric pain /acute pancreatitis- troponin, EKG, ct of the chest shows mild pancreatitis, carafate has helped according to the patient, continue with protonix IV  -GI evaluation  -For EGD  -Lipase elevated    New onset DM2  Diabetic ketoacidosis, resolved  -A1c 14.7  -Glargine 20 units twice daily, SSI; will monitor and adjust based off needs  -Diabetic educator following and has written for prescriptions and supplies  -Will need close follow-up with PCP postdischarge in [place]     Hypernatremia  ARF, improved  Dehydration  -related to above  -d/c fluids     Leukocytosis  -Suspect reactive     Left basilar consolidation  -chest ct     Thrombocytopenia    Diabetes educator  Will check antibodies -quintin and islet cell antibodies    Dvt ppx with scd, hold lovenox given that patient is going for EGD tomorrow and thrombocytopenia    CCP to work on a letter to attempt  to get refund for airline ticket that the patient had for 12/23.  Patient was admitted to the hospital on 12/21    CCP also to facilitate FMLA papers for her son who will be helping the patient when she gets to [place]    Yfn Adhikari MD  12/27/23  17:15 EST

## 2023-12-27 NOTE — PLAN OF CARE
Problem: Skin Injury Risk Increased  Goal: Skin Health and Integrity  Outcome: Ongoing, Progressing  Intervention: Optimize Skin Protection  Recent Flowsheet Documentation  Taken 12/27/2023 0412 by Tavares Herrera RN  Head of Bed (HOB) Positioning: HOB at 20-30 degrees  Taken 12/27/2023 0225 by Tavares Herrera RN  Head of Bed (HOB) Positioning: HOB at 20-30 degrees  Taken 12/27/2023 0010 by Tavares Herrera RN  Head of Bed (HOB) Positioning: HOB at 20-30 degrees  Taken 12/26/2023 2210 by Tavares Herrera RN  Head of Bed (HOB) Positioning: HOB at 20-30 degrees  Taken 12/26/2023 2010 by Tavares Herrera RN  Head of Bed (HOB) Positioning: HOB at 20-30 degrees     Problem: Adjustment to Illness (Sepsis/Septic Shock)  Goal: Optimal Coping  Outcome: Ongoing, Progressing     Problem: Bleeding (Sepsis/Septic Shock)  Goal: Absence of Bleeding  Outcome: Ongoing, Progressing     Problem: Glycemic Control Impaired (Sepsis/Septic Shock)  Goal: Blood Glucose Level Within Desired Range  Outcome: Ongoing, Progressing     Problem: Infection Progression (Sepsis/Septic Shock)  Goal: Absence of Infection Signs and Symptoms  Outcome: Ongoing, Progressing  Intervention: Initiate Sepsis Management  Recent Flowsheet Documentation  Taken 12/27/2023 0412 by Tavares Herrera RN  Infection Prevention: environmental surveillance performed  Taken 12/27/2023 0225 by Tavares Herrera RN  Infection Prevention: environmental surveillance performed  Taken 12/27/2023 0010 by Tavares Herrera RN  Infection Prevention: environmental surveillance performed  Taken 12/26/2023 2210 by Tavares Herrera RN  Infection Prevention: environmental surveillance performed  Taken 12/26/2023 2010 by Tavares Herrera RN  Infection Prevention: environmental surveillance performed  Intervention: Promote Recovery  Recent Flowsheet Documentation  Taken 12/27/2023 0412 by Tavares Herrera RN  Activity Management: activity encouraged  Taken 12/27/2023 0225 by Tavares Herrera RN  Activity  Management: activity encouraged  Taken 12/27/2023 0010 by Tavares Herrera RN  Activity Management: activity encouraged  Taken 12/26/2023 2210 by Tavares Herrera RN  Activity Management: activity encouraged  Taken 12/26/2023 2010 by Tavares Herrera RN  Activity Management: activity encouraged     Problem: Nutrition Impaired (Sepsis/Septic Shock)  Goal: Optimal Nutrition Intake  Outcome: Ongoing, Progressing     Problem: Fall Injury Risk  Goal: Absence of Fall and Fall-Related Injury  Outcome: Ongoing, Progressing  Intervention: Promote Injury-Free Environment  Recent Flowsheet Documentation  Taken 12/27/2023 0412 by Tavares Herrera RN  Safety Promotion/Fall Prevention: safety round/check completed  Taken 12/27/2023 0225 by Tavares Herrera RN  Safety Promotion/Fall Prevention: safety round/check completed  Taken 12/27/2023 0010 by Tavares Herrera RN  Safety Promotion/Fall Prevention: safety round/check completed  Taken 12/26/2023 2210 by Tavares Herrera RN  Safety Promotion/Fall Prevention: safety round/check completed  Taken 12/26/2023 2010 by Tavares Herrera RN  Safety Promotion/Fall Prevention: safety round/check completed     Problem: Adult Inpatient Plan of Care  Goal: Plan of Care Review  Outcome: Ongoing, Progressing  Flowsheets (Taken 12/27/2023 0428)  Outcome Evaluation: pt is ra, standby assist, a&ox4. In bed at lowest position, brakes on, 2 side rails up, bed alarm on, call light within reach.  Goal: Patient-Specific Goal (Individualized)  Outcome: Ongoing, Progressing  Goal: Absence of Hospital-Acquired Illness or Injury  Outcome: Ongoing, Progressing  Intervention: Identify and Manage Fall Risk  Recent Flowsheet Documentation  Taken 12/27/2023 0412 by Tavares Herrera RN  Safety Promotion/Fall Prevention: safety round/check completed  Taken 12/27/2023 0225 by Tavares Herrera RN  Safety Promotion/Fall Prevention: safety round/check completed  Taken 12/27/2023 0010 by Tavares Herrera RN  Safety Promotion/Fall  Prevention: safety round/check completed  Taken 12/26/2023 2210 by Tavares Herrera RN  Safety Promotion/Fall Prevention: safety round/check completed  Taken 12/26/2023 2010 by Tavares Herrera RN  Safety Promotion/Fall Prevention: safety round/check completed  Intervention: Prevent Skin Injury  Recent Flowsheet Documentation  Taken 12/27/2023 0412 by Tavares Herrera RN  Body Position: position changed independently  Taken 12/27/2023 0225 by Tavares Herrera RN  Body Position: position changed independently  Taken 12/27/2023 0010 by Tavares Herrera RN  Body Position: position changed independently  Taken 12/26/2023 2210 by Tavares Herrera RN  Body Position: weight shifting  Taken 12/26/2023 2010 by Tavares Herrera RN  Body Position: sitting up in bed  Intervention: Prevent and Manage VTE (Venous Thromboembolism) Risk  Recent Flowsheet Documentation  Taken 12/27/2023 0412 by Tavares Herrera RN  Activity Management: activity encouraged  Taken 12/27/2023 0225 by Tavares Herrera RN  Activity Management: activity encouraged  Taken 12/27/2023 0010 by Tavares Herrera RN  Activity Management: activity encouraged  Taken 12/26/2023 2210 by Tavares Herrera RN  Activity Management: activity encouraged  Taken 12/26/2023 2010 by Tavares Herrera RN  Activity Management: activity encouraged  Intervention: Prevent Infection  Recent Flowsheet Documentation  Taken 12/27/2023 0412 by Tavares Herrera RN  Infection Prevention: environmental surveillance performed  Taken 12/27/2023 0225 by Tavares Herrera RN  Infection Prevention: environmental surveillance performed  Taken 12/27/2023 0010 by Tavares Herrera RN  Infection Prevention: environmental surveillance performed  Taken 12/26/2023 2210 by Tavares Herrera RN  Infection Prevention: environmental surveillance performed  Taken 12/26/2023 2010 by Tavares Herrera RN  Infection Prevention: environmental surveillance performed  Goal: Optimal Comfort and Wellbeing  Outcome: Ongoing, Progressing  Intervention:  Provide Person-Centered Care  Recent Flowsheet Documentation  Taken 12/27/2023 0010 by Tavares Herrera, RN  Trust Relationship/Rapport:   care explained   choices provided  Taken 12/26/2023 2010 by Tavares Herrera, RN  Trust Relationship/Rapport:   care explained   choices provided  Goal: Readiness for Transition of Care  Outcome: Ongoing, Progressing   Goal Outcome Evaluation:              Outcome Evaluation: pt is ra, standby assist, a&ox4. In bed at lowest position, brakes on, 2 side rails up, bed alarm on, call light within reach.

## 2023-12-27 NOTE — PLAN OF CARE
Goal Outcome Evaluation:  Plan of Care Reviewed With: patient        Progress: no change  Outcome Evaluation: Patient is alert and oriented x4, patient is on room air, VSS, diabetes education came and reinforced previous information. With insturction from RN patient was able to give herself lunch time insulin injection in her abdomen- tolerated well. Patient has not complained of pain to RN but informed diabetes educator that she had abdominal pain- when nurse asked patient declined having any pain. Patient is up with standby assist to bathroom. Bed/chair alarm on, bed in lowest position, and call light within reach.

## 2023-12-27 NOTE — CASE MANAGEMENT/SOCIAL WORK
Discharge Planning Assessment  [facility]     Patient Name: Marnie Middleton  MRN: [id number]  Today's Date: 12/27/2023    Admit Date: 12/21/2023    Plan: home to friends home who will transport to airport.   Discharge Needs Assessment       Row Name 12/27/23 1522       Living Environment    People in Home alone    Current Living Arrangements home    Potentially Unsafe Housing Conditions none    Primary Care Provided by self    Family Caregiver if Needed child(terry), adult    Quality of Family Relationships involved;helpful    Able to Return to Prior Arrangements yes       Resource/Environmental Concerns    Resource/Environmental Concerns none       Transition Planning    Patient/Family Anticipates Transition to home;home with family    Patient/Family Anticipated Services at Transition none    Transportation Anticipated family or friend will provide       Discharge Needs Assessment    Readmission Within the Last 30 Days no previous admission in last 30 days    Equipment Currently Used at Home none    Concerns to be Addressed no discharge needs identified    Anticipated Changes Related to Illness none    Equipment Needed After Discharge none                   Discharge Plan       Row Name 12/27/23 1523       Plan    Plan home to friends home who will transport to airport.    Patient/Family in Agreement with Plan yes    Plan Comments Spoke to pt at bedside, introduced self and explained CCP role, face sheet and pharmacy information verified. Pt lives alone in 1 level home with no steps to enter. She is normally IADL's and uses no medical equipment. No HH or SNF history, she is here visiting from [place], her friend will transport her to airport at [place]. Spoke with pt son Baljeet, he states needs a note for airline to change flight she missed, and FMLA paperwork. He will fax to CCP office and MD will fill out tomorrow. No anticipated needs CCP will follow -Gayle MCNULTY                  Continued Care and Services - Admitted  Since 12/21/2023    Coordination has not been started for this encounter.       Expected Discharge Date and Time       Expected Discharge Date Expected Discharge Time    Dec 27, 2023            Demographic Summary       Row Name 12/27/23 1522       General Information    Admission Type inpatient                   Functional Status       Row Name 12/27/23 1522       Functional Status    Usual Activity Tolerance excellent    Current Activity Tolerance good       Functional Status, IADL    Medications independent    Meal Preparation independent    Housekeeping independent    Laundry independent    Shopping independent       Mental Status    General Appearance WDL WDL       Mental Status Summary    Recent Changes in Mental Status/Cognitive Functioning no changes                   Psychosocial    No documentation.                  Abuse/Neglect    No documentation.                  Legal       Row Name 12/27/23 1522       Financial/Legal    Who Manages Finances if Patient Unable sons                   Substance Abuse    No documentation.                  Patient Forms    No documentation.                     Gayle Harry RN

## 2023-12-27 NOTE — CONSULTS
Diabetes Education    Patient Name:  Marnie Middleton  YOB: 1960  MRN: [id number]  Admit Date:  12/21/2023    Reinforced DM education and answered pt's questions.  Encouraged outpatient DMSES for further education once in [place].      Electronically signed by:  Rody Meeks RN, [facility]  12/27/23 11:45 EST

## 2023-12-27 NOTE — CONSULTS
[facility]  Initial Inpatient Consult Note    Referring Provider: Dr. Adhikari    Reason for Consultation: Pancreatitis    Subjective     History of present illness:    63 y.o. female w/ PMHx of GERD, who is traveling from [place] was brought to the ED w/ acute altered mental status, found to have glucose of 1004 upon arrival. She is currently being treated w/ DKA, new onset DM type II.    GI consulted for incidental finding acute pancreatitis on CT chest yesterday. She has elevated lipase at 136. She reports she has chronic, intermittent epigastric pain related to poorly controlled GERD. She states she does not take any antacids at home. However, she reports she has had EGDs few times in the past with dilation if she has issues w/ swallowing. She does not follow GI on routine basis however. She denies exacerbation of her baseline epigastric pain at this time. No hx of pancreatitis in the past. No ETOH abuse, no family hx of pancreatic related illnesses. No hx of tobacco use.    She reports she is having issues w/ swallowing currently. Feels like pills and sometimes food tends to get stuck. Last EGD 2+ years ago.     She had ultrasound this morning that shows no gallstones/sludge within the gb.     Past Medical History:  History reviewed. No pertinent past medical history.  Past Surgical History:  History reviewed. No pertinent surgical history.   Social History:   Social History     Tobacco Use    Smoking status: Never    Smokeless tobacco: Never   Substance Use Topics    Alcohol use: Never      Family History:  History reviewed. No pertinent family history.    Home Meds:  No medications prior to admission.     Current Meds:   docosanol, 1 application , Topical, 5x Daily  insulin glargine, 20 Units, Subcutaneous, Q12H  insulin lispro, 4-24 Units, Subcutaneous, Q4H  pantoprazole, 40 mg, Intravenous, BID AC  polyethylene glycol, 17 g, Oral, Daily  sodium chloride, 10 mL, Intravenous,  Q12H  sucralfate, 1 g, Oral, 4x Daily AC & at Bedtime      Allergies:  Allergies   Allergen Reactions    Aspartame And Phenylalanine Headache    Aspirin Headache    Tylenol [Acetaminophen] Headache     Patient states it makes her headaches worse.       Objective     Vital Signs  Temp:  [98.1 °F (36.7 °C)-99.1 °F (37.3 °C)] 98.6 °F (37 °C)  Heart Rate:  [81-91] 81  Resp:  [18] 18  BP: ()/(55-67) 115/59  Physical Exam:  General Appearance:     Alert, cooperative, in no acute distress   Abdomen:     Normal bowel sounds, no masses, no organomegaly, soft     nontender, nondistended, no guarding, no rebound                 tenderness   Rectal:     Deferred       Results Review:   I reviewed the patient's new clinical results.    Results from last 7 days   Lab Units 12/27/23  0738 12/25/23  2248 12/25/23  0604   WBC 10*3/mm3 9.81 8.78 7.87   HEMOGLOBIN g/dL 10.2* 10.8* 11.6*   HEMATOCRIT % 30.7* 32.0* 35.6   PLATELETS 10*3/mm3 100* 56* 59*     Results from last 7 days   Lab Units 12/27/23  0738 12/26/23  2356 12/26/23  1545 12/23/23  1957 12/23/23  1918 12/23/23  1430 12/23/23  0929   SODIUM mmol/L 141 142 141   < > 152*  152*   < > 154*   POTASSIUM mmol/L 3.7 3.7 4.2   < > 3.6  3.6   < > 3.2*   CHLORIDE mmol/L 111* 110* 110*   < > 124*  124*   < > 127*   CO2 mmol/L 22.0 23.6 21.8*   < > 20.0*  20.0*   < > 19.0*   BUN mg/dL 6* 8 9   < > 9  9   < > 15   CREATININE mg/dL 0.60 0.66 0.68   < > 0.69  0.69   < > 0.85   CALCIUM mg/dL 7.9* 8.0* 8.3*   < > 6.9*  6.9*   < > 7.5*   BILIRUBIN mg/dL 0.5  --   --   --  0.5  --  0.4   ALK PHOS U/L 137*  --   --   --  98  --  111   ALT (SGPT) U/L 20  --   --   --  23  --  23   AST (SGOT) U/L 16  --   --   --  29  --  30   GLUCOSE mg/dL 114* 99 243*   < > 135*  135*   < > 135*    < > = values in this interval not displayed.         Lab Results   Lab Value Date/Time    LIPASE 136 (H) 12/27/2023 0738       Radiology:  US Gallbladder   Final Result       1. No stones or sludge  are seen within the gallbladder.   2. Pelviectasis noted on the right.       This report was finalized on 12/27/2023 5:56 AM by Dr. Naima Banuelos M.D on Workstation: [id number]          CT Chest Without Contrast Diagnostic   Final Result   1. Bilateral pleural effusions right greater than left with some mild   bilateral lower lobe atelectasis.   2. Hiatal hernia.   3. Inflammatory change around the pancreas with heterogeneous density of   the pancreas. Findings are consistent with acute pancreatitis. Please   correlate.           Radiation dose reduction techniques were utilized, including automated   exposure control and exposure modulation based on body size.           This report was finalized on 12/26/2023 4:29 PM by Dr. Noah Tom M.D on Workstation: [id number]          XR Chest 1 View   Final Result   FINDINGS AND IMPRESSION:   Lungs are hypoinflated. Mild asymmetric pulmonary pacification is   present within the left lung base representing atelectasis versus   pneumonia in the appropriate clinical context correlation with patient   history is recommended follow-up chest CT if clinically indicated. Given   the somewhat focal appearance, recommend follow-up chest radiograph in 6   to 8 weeks to ensure resolution and exclude any possibility neoplasm.   There appears to be a moderate to large hiatal hernia. Cardiac   silhouette is accentuated by low lung volumes..       This report was finalized on 12/22/2023 12:03 AM by Dr. Soy Yanez M.D on Workstation: [id number]          CT Head Without Contrast   Final Result         Electronically signed by Connie Barnes MD on 12-21-23 at 2303            Assessment:   DKA  New dx of DM type II  Pancreatitis on imaging- however patient w/ lipase of 136 and no acute epigastric pain. U/s w/o biliary disease, lipid panel w/o hypertriglyceridemia   GERD  Dysphagia     Plan:   Patient does not meet the criteria for true acute pancreatitis. She is currently  comfortable, no acute epigastric/LUQ pain.   Diet as tolerated from GI perspective  We will plan EGD given recurrent dysphagia. NPO at midnight   Continue PPI meanwhile     I discussed the patients findings and my recommendations with patient.         Umesh Grewal PA-C  [facility]  [address] [place] [address]  Office: [phone]

## 2023-12-27 NOTE — NURSING NOTE
Patient's insulin for discharge delivered to patient's room despite patient not having discharge orders. RN called pharmacy asking if they would return medication to the pharmacy until patient is discharged. Pharmacy declined to do so, asking if medication could be stored on unit or taken home by friend/family. Pharmacy was concerned that medication would potentially be left in pharmacy when patient was discharged and patient would go home without.     RN asked patient if she wanted friend at bedside to take insulin home- they declined as they weren't going straight home.     RN discussed with charge RN and insulin placed in unit's medication fridge. RN will hand off to next shift the location of the insulin and  placed sticky note in patient's chart regarding the location of insulin.

## 2023-12-27 NOTE — PLAN OF CARE
Goal Outcome Evaluation:  Plan of Care Reviewed With: patient           Outcome Evaluation: Pt was found unresponsive at [facility] and admitted with DKA. Pt was visiting from out of town. She normally lives alone and is independently mobile with no assistive device. Pt presents with generalized weakness, impaired balance and unsteady gait. Pt would benefit from PT to address these impairments. Plans to d/c home with friend for a few days then fly back home. Pt needs to be up and moving more often than just with PT. Ecnouraged her to be up to chair for all meals and ambulate to bathroom and in hallways with nursing staff.      Anticipated Discharge Disposition (PT): home with assist

## 2023-12-28 ENCOUNTER — ANESTHESIA EVENT (OUTPATIENT)
Dept: GASTROENTEROLOGY | Facility: HOSPITAL | Age: 63
End: 2023-12-28

## 2023-12-28 ENCOUNTER — ANESTHESIA (OUTPATIENT)
Dept: GASTROENTEROLOGY | Facility: HOSPITAL | Age: 63
End: 2023-12-28

## 2023-12-28 ENCOUNTER — APPOINTMENT (OUTPATIENT)
Dept: CARDIOLOGY | Facility: HOSPITAL | Age: 63
End: 2023-12-28

## 2023-12-28 LAB
BASOPHILS # BLD AUTO: 0.01 10*3/MM3 (ref 0–0.2)
BASOPHILS NFR BLD AUTO: 0.1 % (ref 0–1.5)
BH CV ECHO MEAS - ACS: 1.64 CM
BH CV ECHO MEAS - AO MAX PG: 14.2 MMHG
BH CV ECHO MEAS - AO MEAN PG: 6.2 MMHG
BH CV ECHO MEAS - AO ROOT DIAM: 2.44 CM
BH CV ECHO MEAS - AO V2 MAX: 188.2 CM/SEC
BH CV ECHO MEAS - AO V2 VTI: 29.9 CM
BH CV ECHO MEAS - AVA(I,D): 1.75 CM2
BH CV ECHO MEAS - EDV(CUBED): 56.8 ML
BH CV ECHO MEAS - EDV(MOD-SP2): 68 ML
BH CV ECHO MEAS - EDV(MOD-SP4): 66 ML
BH CV ECHO MEAS - EF(MOD-BP): 64.5 %
BH CV ECHO MEAS - EF(MOD-SP2): 67.6 %
BH CV ECHO MEAS - EF(MOD-SP4): 65.2 %
BH CV ECHO MEAS - ESV(CUBED): 12.3 ML
BH CV ECHO MEAS - ESV(MOD-SP2): 22 ML
BH CV ECHO MEAS - ESV(MOD-SP4): 23 ML
BH CV ECHO MEAS - FS: 40 %
BH CV ECHO MEAS - IVS/LVPW: 0.88 CM
BH CV ECHO MEAS - IVSD: 0.81 CM
BH CV ECHO MEAS - LAT PEAK E' VEL: 6.9 CM/SEC
BH CV ECHO MEAS - LV DIASTOLIC VOL/BSA (35-75): 45.3 CM2
BH CV ECHO MEAS - LV MASS(C)D: 98 GRAMS
BH CV ECHO MEAS - LV MAX PG: 5.3 MMHG
BH CV ECHO MEAS - LV MEAN PG: 3 MMHG
BH CV ECHO MEAS - LV SYSTOLIC VOL/BSA (12-30): 15.8 CM2
BH CV ECHO MEAS - LV V1 MAX: 115.4 CM/SEC
BH CV ECHO MEAS - LV V1 VTI: 24.4 CM
BH CV ECHO MEAS - LVIDD: 3.8 CM
BH CV ECHO MEAS - LVIDS: 2.31 CM
BH CV ECHO MEAS - LVOT AREA: 2.14 CM2
BH CV ECHO MEAS - LVOT DIAM: 1.65 CM
BH CV ECHO MEAS - LVPWD: 0.92 CM
BH CV ECHO MEAS - MED PEAK E' VEL: 7 CM/SEC
BH CV ECHO MEAS - MV A DUR: 0.13 SEC
BH CV ECHO MEAS - MV A MAX VEL: 83.7 CM/SEC
BH CV ECHO MEAS - MV DEC SLOPE: 383.8 CM/SEC2
BH CV ECHO MEAS - MV DEC TIME: 0.21 SEC
BH CV ECHO MEAS - MV E MAX VEL: 71.1 CM/SEC
BH CV ECHO MEAS - MV E/A: 0.85
BH CV ECHO MEAS - MV MAX PG: 3.4 MMHG
BH CV ECHO MEAS - MV MEAN PG: 1.47 MMHG
BH CV ECHO MEAS - MV P1/2T: 73.7 MSEC
BH CV ECHO MEAS - MV V2 VTI: 27 CM
BH CV ECHO MEAS - MVA(P1/2T): 3 CM2
BH CV ECHO MEAS - MVA(VTI): 1.93 CM2
BH CV ECHO MEAS - PA V2 MAX: 106.4 CM/SEC
BH CV ECHO MEAS - PULM A REVS DUR: 0.1 SEC
BH CV ECHO MEAS - PULM A REVS VEL: 36.9 CM/SEC
BH CV ECHO MEAS - PULM DIAS VEL: 41.2 CM/SEC
BH CV ECHO MEAS - PULM S/D: 1.69
BH CV ECHO MEAS - PULM SYS VEL: 69.8 CM/SEC
BH CV ECHO MEAS - QP/QS: 0.91
BH CV ECHO MEAS - RV MAX PG: 1.44 MMHG
BH CV ECHO MEAS - RV V1 MAX: 60 CM/SEC
BH CV ECHO MEAS - RV V1 VTI: 12.9 CM
BH CV ECHO MEAS - RVOT DIAM: 2.16 CM
BH CV ECHO MEAS - RVSP: 27 MMHG
BH CV ECHO MEAS - SI(MOD-SP2): 31.6 ML/M2
BH CV ECHO MEAS - SI(MOD-SP4): 29.5 ML/M2
BH CV ECHO MEAS - SV(LVOT): 52.2 ML
BH CV ECHO MEAS - SV(MOD-SP2): 46 ML
BH CV ECHO MEAS - SV(MOD-SP4): 43 ML
BH CV ECHO MEAS - SV(RVOT): 47.4 ML
BH CV ECHO MEAS - TAPSE (>1.6): 1.8 CM
BH CV ECHO MEAS - TR MAX PG: 24.1 MMHG
BH CV ECHO MEAS - TR MAX VEL: 245.7 CM/SEC
BH CV ECHO MEASUREMENTS AVERAGE E/E' RATIO: 10.23
BH CV XLRA - RV BASE: 2.9 CM
BH CV XLRA - RV LENGTH: 7.2 CM
BH CV XLRA - RV MID: 2.6 CM
BH CV XLRA - TDI S': 12.4 CM/SEC
DEPRECATED RDW RBC AUTO: 41.6 FL (ref 37–54)
EOSINOPHIL # BLD AUTO: 0.18 10*3/MM3 (ref 0–0.4)
EOSINOPHIL NFR BLD AUTO: 2.1 % (ref 0.3–6.2)
ERYTHROCYTE [DISTWIDTH] IN BLOOD BY AUTOMATED COUNT: 12.5 % (ref 12.3–15.4)
GAD65 AB SER IA-ACNC: <5 U/ML (ref 0–5)
GLUCOSE BLDC GLUCOMTR-MCNC: 103 MG/DL (ref 70–130)
GLUCOSE BLDC GLUCOMTR-MCNC: 116 MG/DL (ref 70–130)
GLUCOSE BLDC GLUCOMTR-MCNC: 133 MG/DL (ref 70–130)
GLUCOSE BLDC GLUCOMTR-MCNC: 229 MG/DL (ref 70–130)
GLUCOSE BLDC GLUCOMTR-MCNC: 65 MG/DL (ref 70–130)
GLUCOSE BLDC GLUCOMTR-MCNC: 68 MG/DL (ref 70–130)
GLUCOSE BLDC GLUCOMTR-MCNC: 76 MG/DL (ref 70–130)
GLUCOSE BLDC GLUCOMTR-MCNC: 86 MG/DL (ref 70–130)
GLUCOSE BLDC GLUCOMTR-MCNC: 88 MG/DL (ref 70–130)
HCT VFR BLD AUTO: 29.2 % (ref 34–46.6)
HGB BLD-MCNC: 9.8 G/DL (ref 12–15.9)
IMM GRANULOCYTES # BLD AUTO: 0.13 10*3/MM3 (ref 0–0.05)
IMM GRANULOCYTES NFR BLD AUTO: 1.5 % (ref 0–0.5)
LEFT ATRIUM VOLUME INDEX: 23.9 ML/M2
LYMPHOCYTES # BLD AUTO: 2.61 10*3/MM3 (ref 0.7–3.1)
LYMPHOCYTES NFR BLD AUTO: 30.2 % (ref 19.6–45.3)
MCH RBC QN AUTO: 30.5 PG (ref 26.6–33)
MCHC RBC AUTO-ENTMCNC: 33.6 G/DL (ref 31.5–35.7)
MCV RBC AUTO: 91 FL (ref 79–97)
MONOCYTES # BLD AUTO: 1.05 10*3/MM3 (ref 0.1–0.9)
MONOCYTES NFR BLD AUTO: 12.1 % (ref 5–12)
NEUTROPHILS NFR BLD AUTO: 4.67 10*3/MM3 (ref 1.7–7)
NEUTROPHILS NFR BLD AUTO: 54 % (ref 42.7–76)
NRBC BLD AUTO-RTO: 0 /100 WBC (ref 0–0.2)
PANC ISLET CELL AB TITR SER: NEGATIVE {TITER}
PLATELET # BLD AUTO: 118 10*3/MM3 (ref 140–450)
PMV BLD AUTO: 12.6 FL (ref 6–12)
RBC # BLD AUTO: 3.21 10*6/MM3 (ref 3.77–5.28)
SINUS: 2.5 CM
STJ: 2.49 CM
WBC NRBC COR # BLD AUTO: 8.65 10*3/MM3 (ref 3.4–10.8)

## 2023-12-28 PROCEDURE — 88305 TISSUE EXAM BY PATHOLOGIST: CPT | Performed by: INTERNAL MEDICINE

## 2023-12-28 PROCEDURE — 0D758ZZ DILATION OF ESOPHAGUS, VIA NATURAL OR ARTIFICIAL OPENING ENDOSCOPIC: ICD-10-PCS | Performed by: INTERNAL MEDICINE

## 2023-12-28 PROCEDURE — 25810000003 SODIUM CHLORIDE 0.9 % SOLUTION: Performed by: INTERNAL MEDICINE

## 2023-12-28 PROCEDURE — 63710000001 INSULIN GLARGINE PER 5 UNITS: Performed by: INTERNAL MEDICINE

## 2023-12-28 PROCEDURE — 82948 REAGENT STRIP/BLOOD GLUCOSE: CPT

## 2023-12-28 PROCEDURE — 25010000002 PROPOFOL 10 MG/ML EMULSION: Performed by: NURSE ANESTHETIST, CERTIFIED REGISTERED

## 2023-12-28 PROCEDURE — 87081 CULTURE SCREEN ONLY: CPT | Performed by: INTERNAL MEDICINE

## 2023-12-28 PROCEDURE — 43249 ESOPH EGD DILATION <30 MM: CPT | Performed by: INTERNAL MEDICINE

## 2023-12-28 PROCEDURE — 0DB78ZX EXCISION OF STOMACH, PYLORUS, VIA NATURAL OR ARTIFICIAL OPENING ENDOSCOPIC, DIAGNOSTIC: ICD-10-PCS | Performed by: INTERNAL MEDICINE

## 2023-12-28 PROCEDURE — 43239 EGD BIOPSY SINGLE/MULTIPLE: CPT | Performed by: INTERNAL MEDICINE

## 2023-12-28 PROCEDURE — 93306 TTE W/DOPPLER COMPLETE: CPT | Performed by: INTERNAL MEDICINE

## 2023-12-28 PROCEDURE — 88312 SPECIAL STAINS GROUP 1: CPT | Performed by: INTERNAL MEDICINE

## 2023-12-28 PROCEDURE — 25810000003 SODIUM CHLORIDE 0.9 % SOLUTION: Performed by: HOSPITALIST

## 2023-12-28 PROCEDURE — 85025 COMPLETE CBC W/AUTO DIFF WBC: CPT | Performed by: HOSPITALIST

## 2023-12-28 PROCEDURE — 63710000001 INSULIN LISPRO (HUMAN) PER 5 UNITS: Performed by: INTERNAL MEDICINE

## 2023-12-28 PROCEDURE — 93306 TTE W/DOPPLER COMPLETE: CPT

## 2023-12-28 PROCEDURE — C1726 CATH, BAL DIL, NON-VASCULAR: HCPCS | Performed by: INTERNAL MEDICINE

## 2023-12-28 PROCEDURE — 25510000001 PERFLUTREN (DEFINITY) 8.476 MG IN SODIUM CHLORIDE (PF) 0.9 % 10 ML INJECTION: Performed by: INTERNAL MEDICINE

## 2023-12-28 RX ORDER — PROPOFOL 10 MG/ML
VIAL (ML) INTRAVENOUS AS NEEDED
Status: DISCONTINUED | OUTPATIENT
Start: 2023-12-28 | End: 2023-12-28 | Stop reason: SURG

## 2023-12-28 RX ORDER — DEXTROSE MONOHYDRATE 25 G/50ML
25 INJECTION, SOLUTION INTRAVENOUS ONCE
Status: COMPLETED | OUTPATIENT
Start: 2023-12-28 | End: 2023-12-28

## 2023-12-28 RX ORDER — GLYCOPYRROLATE 0.2 MG/ML
INJECTION INTRAMUSCULAR; INTRAVENOUS AS NEEDED
Status: DISCONTINUED | OUTPATIENT
Start: 2023-12-28 | End: 2023-12-28 | Stop reason: SURG

## 2023-12-28 RX ORDER — DEXTROSE MONOHYDRATE 25 G/50ML
25 INJECTION, SOLUTION INTRAVENOUS
Status: DISCONTINUED | OUTPATIENT
Start: 2023-12-28 | End: 2023-12-28

## 2023-12-28 RX ORDER — SODIUM CHLORIDE 9 MG/ML
1000 INJECTION, SOLUTION INTRAVENOUS CONTINUOUS
Status: DISCONTINUED | OUTPATIENT
Start: 2023-12-28 | End: 2023-12-29 | Stop reason: HOSPADM

## 2023-12-28 RX ORDER — LIDOCAINE HYDROCHLORIDE 20 MG/ML
INJECTION, SOLUTION INFILTRATION; PERINEURAL AS NEEDED
Status: DISCONTINUED | OUTPATIENT
Start: 2023-12-28 | End: 2023-12-28 | Stop reason: SURG

## 2023-12-28 RX ADMIN — SUCRALFATE 1 G: 1 TABLET ORAL at 09:13

## 2023-12-28 RX ADMIN — SUCRALFATE 1 G: 1 TABLET ORAL at 17:53

## 2023-12-28 RX ADMIN — LIDOCAINE HYDROCHLORIDE 60 MG: 20 INJECTION, SOLUTION INFILTRATION; PERINEURAL at 10:36

## 2023-12-28 RX ADMIN — Medication 10 ML: at 20:31

## 2023-12-28 RX ADMIN — Medication 10 ML: at 09:00

## 2023-12-28 RX ADMIN — SODIUM CHLORIDE 100 ML/HR: 9 INJECTION, SOLUTION INTRAVENOUS at 06:05

## 2023-12-28 RX ADMIN — GLYCOPYRROLATE 0.2 MG: 0.2 INJECTION INTRAMUSCULAR; INTRAVENOUS at 10:33

## 2023-12-28 RX ADMIN — PANTOPRAZOLE SODIUM 40 MG: 40 INJECTION, POWDER, FOR SOLUTION INTRAVENOUS at 17:53

## 2023-12-28 RX ADMIN — PROPOFOL 100 MG: 10 INJECTION, EMULSION INTRAVENOUS at 10:36

## 2023-12-28 RX ADMIN — SODIUM CHLORIDE 1000 ML: 9 INJECTION, SOLUTION INTRAVENOUS at 10:29

## 2023-12-28 RX ADMIN — DOCOSANOL 1 APPLICATION: 100 CREAM TOPICAL at 17:54

## 2023-12-28 RX ADMIN — INSULIN GLARGINE 20 UNITS: 100 INJECTION, SOLUTION SUBCUTANEOUS at 09:13

## 2023-12-28 RX ADMIN — PANTOPRAZOLE SODIUM 40 MG: 40 INJECTION, POWDER, FOR SOLUTION INTRAVENOUS at 09:13

## 2023-12-28 RX ADMIN — INSULIN GLARGINE 20 UNITS: 100 INJECTION, SOLUTION SUBCUTANEOUS at 20:31

## 2023-12-28 RX ADMIN — DOCOSANOL 1 APPLICATION: 100 CREAM TOPICAL at 14:31

## 2023-12-28 RX ADMIN — DOCOSANOL 1 APPLICATION: 100 CREAM TOPICAL at 08:41

## 2023-12-28 RX ADMIN — SUCRALFATE 1 G: 1 TABLET ORAL at 20:31

## 2023-12-28 RX ADMIN — PERFLUTREN 1.5 ML: 6.52 INJECTION, SUSPENSION INTRAVENOUS at 15:50

## 2023-12-28 RX ADMIN — DOCOSANOL 1 APPLICATION: 100 CREAM TOPICAL at 20:31

## 2023-12-28 RX ADMIN — INSULIN LISPRO 8 UNITS: 100 INJECTION, SOLUTION INTRAVENOUS; SUBCUTANEOUS at 20:31

## 2023-12-28 RX ADMIN — PROPOFOL 180 MCG/KG/MIN: 10 INJECTION, EMULSION INTRAVENOUS at 10:36

## 2023-12-28 RX ADMIN — DEXTROSE MONOHYDRATE 25 ML: 25 INJECTION, SOLUTION INTRAVENOUS at 10:25

## 2023-12-28 NOTE — CONSULTS
"Nutrition Services    Patient Name:  Marnie Middleton  YOB: 1960  MRN: [id number]  Admit Date:  12/21/2023    Assessment Date:  12/28/23    CLINICAL NUTRITION - EDUCATION     ASSESSMENT  Encounter Information         Consult from Physician    Education topic Carbohydrate counting, Diabetes    Learner Patient    Learning readiness Motivated to learn    Pertinent nutrition history Newly diagnosed T2DM, dysphagia      Anthropometrics         Height Height: 149.9 cm (59.02\")    Weight Weight: 69.1 kg (152 lb 5.4 oz) (12/28/23 0537)    BMI  Body mass index is 30.75 kg/m².   Obese, Class I (30 - 34.9)    Comments      Medication/Biochemical          Pertinent medications Insulin      Pertinent lab values Results from last 7 days   Lab Units 12/27/23  0738 12/26/23  2356 12/26/23  1545 12/23/23  1957 12/23/23  1918 12/23/23  1430 12/23/23  0929   SODIUM mmol/L 141 142 141   < > 152*  152*   < > 154*   POTASSIUM mmol/L 3.7 3.7 4.2   < > 3.6  3.6   < > 3.2*   CHLORIDE mmol/L 111* 110* 110*   < > 124*  124*   < > 127*   CO2 mmol/L 22.0 23.6 21.8*   < > 20.0*  20.0*   < > 19.0*   BUN mg/dL 6* 8 9   < > 9  9   < > 15   CREATININE mg/dL 0.60 0.66 0.68   < > 0.69  0.69   < > 0.85   CALCIUM mg/dL 7.9* 8.0* 8.3*   < > 6.9*  6.9*   < > 7.5*   BILIRUBIN mg/dL 0.5  --   --   --  0.5  --  0.4   ALK PHOS U/L 137*  --   --   --  98  --  111   ALT (SGPT) U/L 20  --   --   --  23  --  23   AST (SGOT) U/L 16  --   --   --  29  --  30   GLUCOSE mg/dL 114* 99 243*   < > 135*  135*   < > 135*    < > = values in this interval not displayed.       Lab Results   Component Value Date    HGBA1C 14.70 (H) 12/21/2023        DIAGNOSIS  PES Statement         Problem  Food and nutrition knowledge deficit    Etiology Medical Diagnosis - new onset T2DM    Signs/Symptoms Information deficit     INTERVENTION  Intervention/Evaluation         Goal   Disease management/therapy, Improved nutrition related labs, Meet nutritional needs for " diagnosis/condition, Provide information     Educated regarding Appropriate portions, Beverage choices, Eating pattern, Food choices, Food preparation, Food shopping, Label reading, Snacks    Resources given Printed materials, Sample menus    Monitor/evaluation  Per protocol    Discharge planning Contact RD if questions/concerns     RD to follow per protocol.     Electronically signed by:  Kasey Burris RD  12/28/23 08:53 EST

## 2023-12-28 NOTE — ANESTHESIA PREPROCEDURE EVALUATION
" Anesthesia Evaluation     Patient summary reviewed and Nursing notes reviewed   no history of anesthetic complications:   NPO Solid Status: > 8 hours  NPO Liquid Status: > 2 hours           Airway   Mallampati: II  Dental      Pulmonary - normal exam   (-) COPD, asthma  Cardiovascular   Exercise tolerance: good (4-7 METS)    ECG reviewed  Rhythm: regular    (-) past MI, angina, cardiac stents      Neuro/Psych  (-) seizures, CVA  GI/Hepatic/Renal/Endo    (+) GERD, diabetes mellitus (A1C 14+, DKA, new diagnosis) type 2 poorly controlled using insulin  (-) liver disease, no renal disease    Musculoskeletal     Abdominal    Substance History - negative use     OB/GYN          Other - negative ROS                         Anesthesia Plan    ASA 3     MAC     (I have reviewed the patient's history and chart with the patient, including all pertinent laboratory results and imaging. I have explained the risks of monitored anesthesia care including but not limited to respiratory depression, possible need for airway intervention, or possible intra-op recall. I explained that airway intervention involves risk of possible dental injury.    VITALS:  /54 (BP Location: Right arm, Patient Position: Lying)   Pulse 81   Temp 36.9 °C (98.4 °F) (Oral)   Resp 18   Ht 149.9 cm (59.02\")   Wt 69.1 kg (152 lb 5.4 oz)   SpO2 100%   BMI 30.75 kg/m² )  intravenous induction     Anesthetic plan, risks, benefits, and alternatives have been provided, discussed and informed consent has been obtained with: patient.  Pre-procedure education provided      CODE STATUS:    Code Status (Patient has no pulse and is not breathing): CPR (Attempt to Resuscitate)  Medical Interventions (Patient has pulse or is breathing): Full Support      "

## 2023-12-28 NOTE — PROGRESS NOTES
Name: Marnie Middleton ADMIT: 2023   : 1960  PCP: Provider, No Known    MRN: [id number] LOS: 7 days   AGE/SEX: 63 y.o. female  ROOM: [facility]     Subjective   Subjective   No complaint except reflux. She states she has had esophageal dilation in the past. No chest pain or shortness of breath.     Objective   Objective   Vital Signs  Temp:  [98.2 °F (36.8 °C)-98.6 °F (37 °C)] 98.4 °F (36.9 °C)  Heart Rate:  [77-90] 77  Resp:  [18] 18  BP: ()/(46-65) 115/65  SpO2:  [96 %-97 %] 97 %  on   ;   Device (Oxygen Therapy): room air  Body mass index is 30.75 kg/m².    Physical Exam  Constitutional:       General: She is not in acute distress.     Appearance: Normal appearance. She is not toxic-appearing.   HENT:      Head: Normocephalic and atraumatic.   Cardiovascular:      Rate and Rhythm: Normal rate and regular rhythm.   Pulmonary:      Effort: Pulmonary effort is normal. No respiratory distress.      Breath sounds: Normal breath sounds. No wheezing.   Abdominal:      General: Bowel sounds are normal.      Palpations: Abdomen is soft.      Tenderness: There is no abdominal tenderness. There is no guarding or rebound.   Musculoskeletal:         General: No swelling.   Skin:     General: Skin is warm and dry.   Neurological:      General: No focal deficit present.      Mental Status: She is alert and oriented to person, place, and time.   Psychiatric:         Mood and Affect: Mood normal.         Behavior: Behavior normal.     Results Review  I reviewed the patient's new clinical results.  Results from last 7 days   Lab Units 23  0738 23  2248 23  0604 23  0321   WBC 10*3/mm3 9.81 8.78 7.87 11.16*   HEMOGLOBIN g/dL 10.2* 10.8* 11.6* 11.3*   PLATELETS 10*3/mm3 100* 56* 59* 67*     Results from last 7 days   Lab Units 23  0738 23  2356 23  1545 23  0916   SODIUM mmol/L 141 142 141 139   POTASSIUM mmol/L 3.7 3.7 4.2 4.5   CHLORIDE mmol/L 111* 110* 110* 110*    CO2 mmol/L 22.0 23.6 21.8* 21.0*   BUN mg/dL 6* 8 9 6*   CREATININE mg/dL 0.60 0.66 0.68 0.54*   GLUCOSE mg/dL 114* 99 243* 199*     Lab Results   Component Value Date    ANIONGAP 8.0 12/27/2023     Estimated Creatinine Clearance: 85.1 mL/min (by C-G formula based on SCr of 0.6 mg/dL).   Lab Results   Component Value Date    EGFR 100.4 12/27/2023     Results from last 7 days   Lab Units 12/27/23  0738 12/23/23  1918 12/23/23  0929 12/21/23  2202   ALBUMIN g/dL 2.8* 2.9* 3.1* 5.3*   BILIRUBIN mg/dL 0.5 0.5 0.4 0.5   ALK PHOS U/L 137* 98 111 239*   AST (SGOT) U/L 16 29 30 18   ALT (SGPT) U/L 20 23 23 33     Results from last 7 days   Lab Units 12/27/23  0738 12/26/23  2356 12/26/23  1545 12/26/23  0916 12/24/23  1208 12/24/23  0321 12/23/23  1957 12/23/23  1918 12/23/23  1430 12/23/23  0929 12/22/23  0125 12/21/23  2202   CALCIUM mg/dL 7.9* 8.0* 8.3* 7.9*   < > 6.5* 6.9* 6.9*  6.9* 7.0* 7.5*   < > 10.8*   ALBUMIN g/dL 2.8*  --   --   --   --   --   --  2.9*  --  3.1*  --  5.3*   MAGNESIUM mg/dL  --   --   --   --   --  1.8 1.8 1.7 1.9 2.0   < > 4.3*   PHOSPHORUS mg/dL  --   --   --   --   --  2.8 1.6* 1.5* 1.3* 0.6*   < > 6.8*    < > = values in this interval not displayed.     Results from last 7 days   Lab Units 12/23/23  1430 12/23/23  0404 12/22/23  1552 12/22/23  0624 12/22/23  0356   PROCALCITONIN ng/mL  --  0.37*  --   --   --    LACTATE mmol/L 1.6  --  4.3* 1.7  1.7 5.3*     Glucose   Date/Time Value Ref Range Status   12/28/2023 0332 88 70 - 130 mg/dL Final   12/28/2023 0100 116 70 - 130 mg/dL Final   12/27/2023 2120 166 (H) 70 - 130 mg/dL Final   12/27/2023 1641 184 (H) 70 - 130 mg/dL Final   12/27/2023 1247 292 (H) 70 - 130 mg/dL Final   12/27/2023 0346 94 70 - 130 mg/dL Final   12/27/2023 0031 89 70 - 130 mg/dL Final       US Gallbladder    Result Date: 12/27/2023   1. No stones or sludge are seen within the gallbladder. 2. Pelviectasis noted on the right.  This report was finalized on 12/27/2023 5:56  AM by Dr. Naima Banuelos M.D on Workstation: [id number]      CT Chest Without Contrast Diagnostic    Result Date: 12/26/2023  1. Bilateral pleural effusions right greater than left with some mild bilateral lower lobe atelectasis. 2. Hiatal hernia. 3. Inflammatory change around the pancreas with heterogeneous density of the pancreas. Findings are consistent with acute pancreatitis. Please correlate.   Radiation dose reduction techniques were utilized, including automated exposure control and exposure modulation based on body size.   This report was finalized on 12/26/2023 4:29 PM by Dr. Noah Tom M.D on Workstation: [id number]       Scheduled Meds  docosanol, 1 application , Topical, 5x Daily  insulin glargine, 20 Units, Subcutaneous, Q12H  insulin lispro, 4-24 Units, Subcutaneous, Q4H  pantoprazole, 40 mg, Intravenous, BID AC  polyethylene glycol, 17 g, Oral, Daily  sodium chloride, 10 mL, Intravenous, Q12H  sucralfate, 1 g, Oral, 4x Daily AC & at Bedtime    Continuous Infusions  dextrose 5 % and sodium chloride 0.45 %, 150 mL/hr  dextrose 5 % and sodium chloride 0.45 % with KCl 20 mEq/L, 150 mL/hr  dextrose 5 % and sodium chloride 0.45 % with KCl 40 mEq/L, 150 mL/hr  dextrose 5 % and sodium chloride 0.9 %, 150 mL/hr  dextrose 5 % and sodium chloride 0.9 % with KCl 20 mEq, 150 mL/hr  dextrose 5% and sodium chloride 0.9% with KCl 40 mEq/L, 150 mL/hr  insulin, 0-100 Units/hr, Last Rate: Stopped (12/23/23 1943)  sodium chloride 0.45 % 1,000 mL with potassium chloride 40 mEq infusion, 250 mL/hr  sodium chloride, 250 mL/hr  sodium chloride 0.45 % with KCl 20 mEq, 250 mL/hr, Last Rate: Stopped (12/22/23 1415)  sodium chloride, 250 mL/hr  sodium chloride, 100 mL/hr, Last Rate: 100 mL/hr (12/28/23 0605)  sodium chloride 0.9 % with KCl 20 mEq, 250 mL/hr  sodium chloride 0.9 % with KCl 40 mEq/L, 250 mL/hr    PRN Meds    calcium carbonate    Calcium Replacement - Follow Nurse / BPA Driven Protocol    dextrose     dextrose    dextrose    dextrose    dextrose 5 % and sodium chloride 0.45 %    dextrose 5 % and sodium chloride 0.45 % with KCl 20 mEq/L    dextrose 5 % and sodium chloride 0.45 % with KCl 40 mEq/L    dextrose 5 % and sodium chloride 0.9 %    dextrose 5 % and sodium chloride 0.9 % with KCl 20 mEq    dextrose 5% and sodium chloride 0.9% with KCl 40 mEq/L    glucagon (human recombinant)    glucagon (human recombinant)    Magnesium Standard Dose Replacement - Follow Nurse / BPA Driven Protocol    ondansetron    oxyCODONE    Phosphorus Replacement - Follow Nurse / BPA Driven Protocol    Potassium Replacement - Follow Nurse / BPA Driven Protocol    prochlorperazine    sodium chloride 0.45 % 1,000 mL with potassium chloride 40 mEq infusion    sodium chloride    sodium chloride 0.45 % with KCl 20 mEq    [COMPLETED] Insert Peripheral IV **AND** sodium chloride    sodium chloride    sodium chloride    sodium chloride    sodium chloride 0.9 % with KCl 20 mEq    sodium chloride 0.9 % with KCl 40 mEq/L    dextrose 5 % and sodium chloride 0.45 %, 150 mL/hr  dextrose 5 % and sodium chloride 0.45 % with KCl 20 mEq/L, 150 mL/hr  dextrose 5 % and sodium chloride 0.45 % with KCl 40 mEq/L, 150 mL/hr  dextrose 5 % and sodium chloride 0.9 %, 150 mL/hr  dextrose 5 % and sodium chloride 0.9 % with KCl 20 mEq, 150 mL/hr  dextrose 5% and sodium chloride 0.9% with KCl 40 mEq/L, 150 mL/hr  insulin, 0-100 Units/hr, Last Rate: Stopped (12/23/23 1943)  sodium chloride 0.45 % 1,000 mL with potassium chloride 40 mEq infusion, 250 mL/hr  sodium chloride, 250 mL/hr  sodium chloride 0.45 % with KCl 20 mEq, 250 mL/hr, Last Rate: Stopped (12/22/23 1415)  sodium chloride, 250 mL/hr  sodium chloride, 100 mL/hr, Last Rate: 100 mL/hr (12/28/23 0605)  sodium chloride 0.9 % with KCl 20 mEq, 250 mL/hr  sodium chloride 0.9 % with KCl 40 mEq/L, 250 mL/hr    Diet  NPO Diet NPO Type: Strict NPO    I have personally reviewed:  [x]  Medications  []  Laboratory    [x]  Microbiology   [x]  Radiology   [x]  EKG/Telemetry sinus on telemetry  []  Cardiology/Vascular   []  Pathology   []  Records               Assessment/Plan     Active Hospital Problems    Diagnosis  POA    **DKA (diabetic ketoacidosis) [E11.10]  Yes    Hypernatremia [E87.0]  Yes    Leukocytosis [D72.829]  Yes    New onset type 2 diabetes mellitus [E11.9]  Yes    Dehydration [E86.0]  Yes    Abnormal CXR [R93.89]  Yes    Dysphagia [R13.10]  Unknown      Resolved Hospital Problems   No resolved problems to display.     63 y.o. female admitted with DKA, new diagnosis of diabetes    Epigastric pain  acute pancreatitis on CT  troponin, EKG, ct of the chest shows mild pancreatitis, carafate has helped according to the patient, continue with protonix IV  Per GI does not meet criteria for acute pancreatitis  EGD today for recurrent dysphagia    New DM2  Diabetic ketoacidosis, resolved  A1c 14.7  Glargine 20 units twice daily, SSI; will monitor and adjust based off needs  Diabetic educator following and has written for prescriptions and supplies  Will need close follow-up with PCP in [place]     Hypernatremia  ARF, improved  Dehydration  related to above  d/c fluids     Leukocytosis  Suspect reactive- resolved     Left basilar consolidation  chest ct with bilateral pleural effusions right greater than left  consider echo here vs [place]. At any rate would need follow-up imaging  Asymptomatic    Thrombocytopenia  Improved, continue to monitor     DVT prophylaxis  SCDs    Discharge  May be after EGD today  Expected Discharge Date: 12/29/2023; Expected Discharge Time:     Discussed with patient and nursing staff    Poli Warren MD  [facility]  12/28/23

## 2023-12-28 NOTE — ANESTHESIA POSTPROCEDURE EVALUATION
Patient: Marnie Middleton    Procedure Summary       Date: 12/28/23 Room / Location:  [facility] /  [facility]    Anesthesia Start: 1032 Anesthesia Stop: 1053    Procedure: ESOPHAGOGASTRODUODENOSCOPY with biopsies and esophageal dilatation with 15 mm baloon (Esophagus) Diagnosis:       Dysphagia, unspecified type      (Dysphagia, unspecified type [R13.10])    Surgeons: Celestino Kent MD Provider: Francheska Madison MD    Anesthesia Type: MAC ASA Status: 3            Anesthesia Type: MAC    Vitals  Vitals Value Taken Time   /76 12/28/23 1054   Temp     Pulse 92 12/28/23 1105   Resp 16 12/28/23 1053   SpO2 98 % 12/28/23 1105   Vitals shown include unfiled device data.        Post Anesthesia Care and Evaluation    Patient location during evaluation: PHASE II  Patient participation: complete - patient participated  Level of consciousness: awake  Pain management: adequate    Airway patency: patent  Anesthetic complications: No anesthetic complications  PONV Status: none  Cardiovascular status: stable  Respiratory status: acceptable  Hydration status: acceptable

## 2023-12-28 NOTE — SIGNIFICANT NOTE
12/28/23 1052   OTHER   Discipline physical therapist   Rehab Time/Intention   Session Not Performed patient unavailable for treatment  (off the floor to echo. Will follow up tomorrow)   Recommendation   PT - Next Appointment 12/29/23

## 2023-12-29 ENCOUNTER — READMISSION MANAGEMENT (OUTPATIENT)
Dept: CALL CENTER | Facility: HOSPITAL | Age: 63
End: 2023-12-29

## 2023-12-29 ENCOUNTER — APPOINTMENT (OUTPATIENT)
Dept: GENERAL RADIOLOGY | Facility: HOSPITAL | Age: 63
End: 2023-12-29

## 2023-12-29 VITALS
DIASTOLIC BLOOD PRESSURE: 60 MMHG | OXYGEN SATURATION: 98 % | BODY MASS INDEX: 30.64 KG/M2 | TEMPERATURE: 98.6 F | WEIGHT: 152 LBS | RESPIRATION RATE: 18 BRPM | SYSTOLIC BLOOD PRESSURE: 117 MMHG | HEIGHT: 59 IN | HEART RATE: 93 BPM

## 2023-12-29 LAB
ANION GAP SERPL CALCULATED.3IONS-SCNC: 9 MMOL/L (ref 5–15)
BASOPHILS # BLD AUTO: 0.02 10*3/MM3 (ref 0–0.2)
BASOPHILS NFR BLD AUTO: 0.2 % (ref 0–1.5)
BUN SERPL-MCNC: 3 MG/DL (ref 8–23)
BUN/CREAT SERPL: 4.6 (ref 7–25)
CALCIUM SPEC-SCNC: 7.8 MG/DL (ref 8.6–10.5)
CHLORIDE SERPL-SCNC: 110 MMOL/L (ref 98–107)
CO2 SERPL-SCNC: 23 MMOL/L (ref 22–29)
CREAT SERPL-MCNC: 0.65 MG/DL (ref 0.57–1)
DEPRECATED RDW RBC AUTO: 40.8 FL (ref 37–54)
EGFRCR SERPLBLD CKD-EPI 2021: 98.5 ML/MIN/1.73
EOSINOPHIL # BLD AUTO: 0.2 10*3/MM3 (ref 0–0.4)
EOSINOPHIL NFR BLD AUTO: 2.3 % (ref 0.3–6.2)
ERYTHROCYTE [DISTWIDTH] IN BLOOD BY AUTOMATED COUNT: 12.5 % (ref 12.3–15.4)
GLUCOSE BLDC GLUCOMTR-MCNC: 124 MG/DL (ref 70–130)
GLUCOSE BLDC GLUCOMTR-MCNC: 171 MG/DL (ref 70–130)
GLUCOSE BLDC GLUCOMTR-MCNC: 75 MG/DL (ref 70–130)
GLUCOSE SERPL-MCNC: 151 MG/DL (ref 65–99)
HBA1C MFR BLD: 13.7 % (ref 4.8–5.6)
HCT VFR BLD AUTO: 31.6 % (ref 34–46.6)
HGB BLD-MCNC: 10.7 G/DL (ref 12–15.9)
IMM GRANULOCYTES # BLD AUTO: 0.23 10*3/MM3 (ref 0–0.05)
IMM GRANULOCYTES NFR BLD AUTO: 2.6 % (ref 0–0.5)
LAB AP CASE REPORT: NORMAL
LAB AP DIAGNOSIS COMMENT: NORMAL
LYMPHOCYTES # BLD AUTO: 2.52 10*3/MM3 (ref 0.7–3.1)
LYMPHOCYTES NFR BLD AUTO: 28.7 % (ref 19.6–45.3)
MCH RBC QN AUTO: 30.7 PG (ref 26.6–33)
MCHC RBC AUTO-ENTMCNC: 33.9 G/DL (ref 31.5–35.7)
MCV RBC AUTO: 90.5 FL (ref 79–97)
MONOCYTES # BLD AUTO: 0.91 10*3/MM3 (ref 0.1–0.9)
MONOCYTES NFR BLD AUTO: 10.4 % (ref 5–12)
NEUTROPHILS NFR BLD AUTO: 4.91 10*3/MM3 (ref 1.7–7)
NEUTROPHILS NFR BLD AUTO: 55.8 % (ref 42.7–76)
NRBC BLD AUTO-RTO: 0.1 /100 WBC (ref 0–0.2)
PATH REPORT.FINAL DX SPEC: NORMAL
PATH REPORT.GROSS SPEC: NORMAL
PLATELET # BLD AUTO: 160 10*3/MM3 (ref 140–450)
PMV BLD AUTO: 12.4 FL (ref 6–12)
POTASSIUM SERPL-SCNC: 3.2 MMOL/L (ref 3.5–5.2)
RBC # BLD AUTO: 3.49 10*6/MM3 (ref 3.77–5.28)
SODIUM SERPL-SCNC: 142 MMOL/L (ref 136–145)
UREASE TISS QL: NEGATIVE
WBC NRBC COR # BLD AUTO: 8.79 10*3/MM3 (ref 3.4–10.8)

## 2023-12-29 PROCEDURE — 83036 HEMOGLOBIN GLYCOSYLATED A1C: CPT | Performed by: HOSPITALIST

## 2023-12-29 PROCEDURE — 63710000001 INSULIN GLARGINE PER 5 UNITS: Performed by: INTERNAL MEDICINE

## 2023-12-29 PROCEDURE — 63710000001 INSULIN LISPRO (HUMAN) PER 5 UNITS: Performed by: HOSPITALIST

## 2023-12-29 PROCEDURE — 80048 BASIC METABOLIC PNL TOTAL CA: CPT | Performed by: INTERNAL MEDICINE

## 2023-12-29 PROCEDURE — 71046 X-RAY EXAM CHEST 2 VIEWS: CPT

## 2023-12-29 PROCEDURE — 82948 REAGENT STRIP/BLOOD GLUCOSE: CPT

## 2023-12-29 PROCEDURE — 85025 COMPLETE CBC W/AUTO DIFF WBC: CPT | Performed by: INTERNAL MEDICINE

## 2023-12-29 PROCEDURE — 99232 SBSQ HOSP IP/OBS MODERATE 35: CPT | Performed by: NURSE PRACTITIONER

## 2023-12-29 RX ORDER — POTASSIUM CHLORIDE 750 MG/1
40 TABLET, FILM COATED, EXTENDED RELEASE ORAL EVERY 4 HOURS
Status: COMPLETED | OUTPATIENT
Start: 2023-12-29 | End: 2023-12-29

## 2023-12-29 RX ORDER — IBUPROFEN 600 MG/1
1 TABLET ORAL
Status: DISCONTINUED | OUTPATIENT
Start: 2023-12-29 | End: 2023-12-29 | Stop reason: HOSPADM

## 2023-12-29 RX ORDER — PANTOPRAZOLE SODIUM 40 MG/10ML
40 INJECTION, POWDER, LYOPHILIZED, FOR SOLUTION INTRAVENOUS
Qty: 60 EACH | Refills: 0 | Status: SHIPPED | OUTPATIENT
Start: 2023-12-29 | End: 2023-12-29 | Stop reason: HOSPADM

## 2023-12-29 RX ORDER — PANTOPRAZOLE SODIUM 40 MG/1
40 TABLET, DELAYED RELEASE ORAL 2 TIMES DAILY
Qty: 60 TABLET | Refills: 0 | Status: SHIPPED | OUTPATIENT
Start: 2023-12-29

## 2023-12-29 RX ORDER — INSULIN ASPART 100 [IU]/ML
6 INJECTION, SOLUTION INTRAVENOUS; SUBCUTANEOUS
Qty: 15 ML | Refills: 0 | Status: SHIPPED | OUTPATIENT
Start: 2023-12-29

## 2023-12-29 RX ORDER — POLYETHYLENE GLYCOL 3350 17 G/17G
17 POWDER, FOR SOLUTION ORAL DAILY
Qty: 510 G | Refills: 0 | Status: SHIPPED | OUTPATIENT
Start: 2023-12-30

## 2023-12-29 RX ORDER — DEXTROSE MONOHYDRATE 25 G/50ML
25 INJECTION, SOLUTION INTRAVENOUS
Status: DISCONTINUED | OUTPATIENT
Start: 2023-12-29 | End: 2023-12-29 | Stop reason: HOSPADM

## 2023-12-29 RX ORDER — SUCRALFATE 1 G/1
1 TABLET ORAL
Qty: 120 TABLET | Refills: 0 | Status: SHIPPED | OUTPATIENT
Start: 2023-12-29

## 2023-12-29 RX ORDER — INSULIN LISPRO 100 [IU]/ML
2-9 INJECTION, SOLUTION INTRAVENOUS; SUBCUTANEOUS
Status: DISCONTINUED | OUTPATIENT
Start: 2023-12-29 | End: 2023-12-29 | Stop reason: HOSPADM

## 2023-12-29 RX ORDER — NICOTINE POLACRILEX 4 MG
15 LOZENGE BUCCAL
Status: DISCONTINUED | OUTPATIENT
Start: 2023-12-29 | End: 2023-12-29 | Stop reason: HOSPADM

## 2023-12-29 RX ORDER — INSULIN DETEMIR 100 [IU]/ML
24 INJECTION, SOLUTION SUBCUTANEOUS DAILY
Qty: 15 ML | Refills: 0 | Status: SHIPPED | OUTPATIENT
Start: 2023-12-29

## 2023-12-29 RX ORDER — INSULIN DETEMIR 100 [IU]/ML
30 INJECTION, SOLUTION SUBCUTANEOUS DAILY
Qty: 15 ML | Refills: 0 | Status: SHIPPED | OUTPATIENT
Start: 2023-12-29 | End: 2023-12-29 | Stop reason: SDUPTHER

## 2023-12-29 RX ADMIN — SUCRALFATE 1 G: 1 TABLET ORAL at 06:28

## 2023-12-29 RX ADMIN — POTASSIUM CHLORIDE 40 MEQ: 750 TABLET, EXTENDED RELEASE ORAL at 08:59

## 2023-12-29 RX ADMIN — DOCOSANOL 1 APPLICATION: 100 CREAM TOPICAL at 06:43

## 2023-12-29 RX ADMIN — INSULIN LISPRO 2 UNITS: 100 INJECTION, SOLUTION INTRAVENOUS; SUBCUTANEOUS at 11:14

## 2023-12-29 RX ADMIN — POTASSIUM CHLORIDE 40 MEQ: 750 TABLET, EXTENDED RELEASE ORAL at 14:31

## 2023-12-29 RX ADMIN — INSULIN LISPRO 2 UNITS: 100 INJECTION, SOLUTION INTRAVENOUS; SUBCUTANEOUS at 08:59

## 2023-12-29 RX ADMIN — DOCOSANOL 1 APPLICATION: 100 CREAM TOPICAL at 11:14

## 2023-12-29 RX ADMIN — PANTOPRAZOLE SODIUM 40 MG: 40 INJECTION, POWDER, FOR SOLUTION INTRAVENOUS at 06:29

## 2023-12-29 RX ADMIN — INSULIN GLARGINE 20 UNITS: 100 INJECTION, SOLUTION SUBCUTANEOUS at 09:00

## 2023-12-29 RX ADMIN — DOCOSANOL 1 APPLICATION: 100 CREAM TOPICAL at 14:32

## 2023-12-29 RX ADMIN — SUCRALFATE 1 G: 1 TABLET ORAL at 11:14

## 2023-12-29 NOTE — PLAN OF CARE
Goal Outcome Evaluation:  Plan of Care Reviewed With: patient        Progress: improving  Outcome Evaluation: Up with standby assist to BR.  Blood sugar down to 75 at 0400, gave taiwo crackers to prevent further drop.

## 2023-12-29 NOTE — DISCHARGE SUMMARY
[facility]    Date of Discharge:  12/29/2023    PCP: Provider, No Known    Discharge Diagnosis:   Active Hospital Problems    Diagnosis  POA    **DKA (diabetic ketoacidosis) [E11.10]  Yes    Hypernatremia [E87.0]  Yes    Leukocytosis [D72.829]  Yes    New onset type 2 diabetes mellitus [E11.9]  Yes    Dehydration [E86.0]  Yes    Abnormal CXR [R93.89]  Yes    Dysphagia [R13.10]  Unknown      Resolved Hospital Problems   No resolved problems to display.     Procedure(s):  ESOPHAGOGASTRODUODENOSCOPY with biopsies and esophageal dilatation with 15 mm baloon  12/28 1001 [facility]  Consults       Date and Time Order Name Status Description    12/26/2023 11:07 PM Inpatient Gastroenterology Consult Completed     12/24/2023  7:34 AM Inpatient Internal Medicine Consult Completed     12/21/2023 11:45 PM Pulmonology (on-call MD unless specified)            Hospital Course  63 y.o. female initially admitted to the intensive care unit for DKA (glucose over one thousand) and new diagnosis of diabetes mellitus type 2. DKA resolved and she was transferred to our service. She has been transitioned to subcutaneous insulin. She had some mild hypoglycemia yesterday none since but insulin regimen will be reduced some to avoid hypoglycemia. She will need close follow-up with her PCP in [place].    She had some epigastric pain. Imaging suggested acute pancreatitis however GI did not feel that she met criteria for acute pancreatitis. She has some recurrent dysphagia and an EGD was done that showed hiatal hernia, esophageal stenosis that was dilated (she has had dilation in the past). There was a nonbleeding gastric ulcer and gastric erosions. GI recommended PPI and Carafate for 4 weeks. They also recommended that she follow an antireflux regimen indefinitely.    A chest x-ray that was done that showed asymmetric opacification left lung base. A follow-up CT chest showed no  lung masses but did show bilateral pleural effusions right greater than left. An echocardiogram done here is below. She is asymptomatic and follow-up x-ray today showed minimal bilateral pleural effusions. I discussed pleural effusions with the patient and she agrees to follow-up with her PCP when she returns to [place].    Results for orders placed during the hospital encounter of 12/21/23    Adult Transthoracic Echo Complete W/ Cont if Necessary Per Protocol    Interpretation Summary    Left ventricular systolic function is normal. Calculated left ventricular EF = 64.5%    Left ventricular diastolic function was normal.    Estimated right ventricular systolic pressure from tricuspid regurgitation is normal (<35 mmHg).     \    I discussed the patient's findings and my recommendations with patient and nursing staff.    Temp:  [98.2 °F (36.8 °C)-99.1 °F (37.3 °C)] 98.6 °F (37 °C)  Heart Rate:  [73-93] 93  Resp:  [18-22] 18  BP: ()/(50-87) 117/60  Body mass index is 30.7 kg/m².    Physical Exam  Constitutional:       General: She is not in acute distress.     Appearance: Normal appearance. She is not toxic-appearing.   HENT:      Head: Normocephalic and atraumatic.   Cardiovascular:      Rate and Rhythm: Normal rate and regular rhythm.   Pulmonary:      Effort: Pulmonary effort is normal. No respiratory distress.      Breath sounds: Normal breath sounds. No wheezing or rhonchi.   Abdominal:      General: Bowel sounds are normal.      Palpations: Abdomen is soft.      Tenderness: There is no abdominal tenderness. There is no guarding or rebound.   Musculoskeletal:         General: No swelling.   Skin:     General: Skin is warm and dry.   Neurological:      General: No focal deficit present.      Mental Status: She is alert and oriented to person, place, and time.   Psychiatric:         Mood and Affect: Mood normal.         Behavior: Behavior normal.       Disposition: Home or Self Care       Discharge  Medications        New Medications        Instructions Start Date   Insulin Pen Needle 32G X 4 MM misc   Use to inject insulin SQ via pens up to 4 times daily      Levemir FlexPen 100 UNIT/ML injection  Generic drug: insulin detemir   24 Units, Subcutaneous, Daily      NovoLOG FlexPen 100 UNIT/ML solution pen-injector sc pen  Generic drug: insulin aspart   6 Units, Subcutaneous, 3 Times Daily With Meals      pantoprazole 40 MG injection  Commonly known as: PROTONIX   40 mg, Intravenous, 2 Times Daily Before Meals      polyethylene glycol 17 GM/SCOOP powder  Commonly known as: MIRALAX   17 g, Oral, Daily   Start Date: December 30, 2023     sucralfate 1 g tablet  Commonly known as: CARAFATE   1 g, Oral, 4 Times Daily Before Meals & Nightly              Diet Instructions       Diet: Regular/House Diet, Diabetic Diets; Consistent Carbohydrate      Discharge Diet:  Regular/House Diet  Diabetic Diets       Diabetic Diet: Consistent Carbohydrate    Texture: Regular Texture (IDDSI 7)    Fluid Consistency: Thin (IDDSI 0)           Activity Instructions       Activity as Tolerated             Additional Instructions for the Follow-ups that You Need to Schedule       Call MD for problems / concerns.   As directed                  No future appointments.  Pending Labs       Order Current Status    Urease For H Pylori - Tissue, Gastric, Body In process           Poli Warren MD  [facility]  12/29/23    Discharge time spent greater than 30 minutes.

## 2023-12-29 NOTE — OUTREACH NOTE
Prep Survey      Flowsheet Row Responses   [facility] discharged from? [facility]   Is LACE score < 7 ? No   Eligibility Readm Mgmt   Discharge diagnosis DKA (diabetic ketoacidosis)   Does the patient have one of the following disease processes/diagnoses(primary or secondary)? Other   Does the patient have Home health ordered? No   Is there a DME ordered? No   General alerts for this patient lives in [facility]   Prep survey completed? Yes            TYSHAWN POLLOCK - Registered Nurse

## 2023-12-29 NOTE — PROGRESS NOTES
Gastroenterology   Inpatient Progress Note    Reason for Follow Up: Pancreatitis    Subjective     Interval History:   S/p EGD on 12/28 revealing a 6 cm hiatal hernia, and a benign stenosis at the GE junction that was traversed and dilated with a balloon dilator to 15 mm.  Patient was noted to have nonbleeding gastric ulcer with pigmented material and gastric erosions with no stigmata of recent bleeding.    She denies have any epigastric pain, but still reports some left upper quadrant/sided pain.  Denies any nausea or vomiting.  Tolerating diet, but states that her tray has included items that she should not be eating.    Current Facility-Administered Medications:     calcium carbonate (TUMS) chewable tablet 500 mg (200 mg elemental), 2 tablet, Oral, TID PRN, Celestino Kent MD, 2 tablet at 12/26/23 0601    dextrose (D50W) (25 g/50 mL) IV injection 25 g, 25 g, Intravenous, Q15 Min PRN, Poli Warren MD    dextrose (GLUTOSE) oral gel 15 g, 15 g, Oral, Q15 Min PRN, Poli Warren MD    docosanol (ABREVA) 10 % cream 1 application , 1 application , Topical, 5x Daily, Celestino Kent MD, 1 application  at 12/29/23 0643    glucagon (GLUCAGEN) injection 1 mg, 1 mg, Intramuscular, Q15 Min PRN, Poli Warren MD    insulin glargine (LANTUS, SEMGLEE) injection 20 Units, 20 Units, Subcutaneous, Q12H, Celestino Kent MD, 20 Units at 12/28/23 2031    insulin lispro (HUMALOG/ADMELOG) injection 2-9 Units, 2-9 Units, Subcutaneous, 4x Daily AC & at Bedtime, Poli Warren MD    ondansetron (ZOFRAN) injection 4 mg, 4 mg, Intravenous, Q6H PRN, Celestino Kent MD, 4 mg at 12/23/23 1833    oxyCODONE (ROXICODONE) immediate release tablet 5 mg, 5 mg, Oral, Q4H PRN, Celestino Kent MD, 5 mg at 12/25/23 3082    pantoprazole (PROTONIX) injection 40 mg, 40 mg, Intravenous, BID Donte MENDOZA Frederick J, MD, 40 mg at 12/29/23 0629    polyethylene glycol (MIRALAX) packet 17 g, 17 g, Oral, Daily, Donte,  Celestino MORENO MD, 17 g at 12/27/23 0859    Potassium Replacement - Follow Nurse / BPA Driven Protocol, , Does not apply, PRNDonte Frederick J, MD    prochlorperazine (COMPAZINE) injection 5 mg, 5 mg, Intravenous, Q4H PRN, Celestino Kent MD, 5 mg at 12/24/23 1023    [COMPLETED] Insert Peripheral IV, , , Once **AND** sodium chloride 0.9 % flush 10 mL, 10 mL, Intravenous, PRN, Celestino Kent MD    sodium chloride 0.9 % infusion 1,000 mL, 1,000 mL, Intravenous, Continuous, Celestino Kent MD, Last Rate: 25 mL/hr at 12/28/23 1029, 1,000 mL at 12/28/23 1029    sucralfate (CARAFATE) tablet 1 g, 1 g, Oral, 4x Daily AC & at Bedtime, Celestino Kent MD, 1 g at 12/29/23 0628  Review of Systems:    All systems were reviewed and negative except for:  Gastrointestinal: positive for  pain    Objective     Vital Signs  Temp:  [98.2 °F (36.8 °C)-99.1 °F (37.3 °C)] 99.1 °F (37.3 °C)  Heart Rate:  [73-94] 90  Resp:  [16-22] 18  BP: (101-148)/(50-87) 148/87  Body mass index is 30.7 kg/m².  No intake or output data in the 24 hours ending 12/29/23 0755  No intake/output data recorded.     Physical Exam:   General: patient awake, alert and cooperative   Eyes: no scleral icterus   Skin: warm and dry, not jaundiced   Abdomen: soft, + tenderness in epigastric area, nondistended; normal bowel sounds, no masses palpated, no periumbical lymphadenopathy   Psychiatric: Appropriate affect and behavior     Results Review:     I reviewed the patient's new clinical results.  I reviewed the patient's new imaging results and agree with the interpretation.  I reviewed the patient's other test results and agree with the interpretation    Results from last 7 days   Lab Units 12/28/23  0704 12/27/23  0738 12/25/23  2248   WBC 10*3/mm3 8.65 9.81 8.78   HEMOGLOBIN g/dL 9.8* 10.2* 10.8*   HEMATOCRIT % 29.2* 30.7* 32.0*   PLATELETS 10*3/mm3 118* 100* 56*     Results from last 7 days   Lab Units 12/27/23  0738 12/26/23  5102  12/26/23  1545 12/23/23  1957 12/23/23  1918 12/23/23  1430 12/23/23  0929   SODIUM mmol/L 141 142 141   < > 152*  152*   < > 154*   POTASSIUM mmol/L 3.7 3.7 4.2   < > 3.6  3.6   < > 3.2*   CHLORIDE mmol/L 111* 110* 110*   < > 124*  124*   < > 127*   CO2 mmol/L 22.0 23.6 21.8*   < > 20.0*  20.0*   < > 19.0*   BUN mg/dL 6* 8 9   < > 9  9   < > 15   CREATININE mg/dL 0.60 0.66 0.68   < > 0.69  0.69   < > 0.85   CALCIUM mg/dL 7.9* 8.0* 8.3*   < > 6.9*  6.9*   < > 7.5*   BILIRUBIN mg/dL 0.5  --   --   --  0.5  --  0.4   ALK PHOS U/L 137*  --   --   --  98  --  111   ALT (SGPT) U/L 20  --   --   --  23  --  23   AST (SGOT) U/L 16  --   --   --  29  --  30   GLUCOSE mg/dL 114* 99 243*   < > 135*  135*   < > 135*    < > = values in this interval not displayed.         Lab Results   Lab Value Date/Time    LIPASE 136 (H) 12/27/2023 0738       Radiology:  US Gallbladder   Final Result       1. No stones or sludge are seen within the gallbladder.   2. Pelviectasis noted on the right.       This report was finalized on 12/27/2023 5:56 AM by Dr. Naima Banuelos M.D on Workstation: [id number]          CT Chest Without Contrast Diagnostic   Final Result   1. Bilateral pleural effusions right greater than left with some mild   bilateral lower lobe atelectasis.   2. Hiatal hernia.   3. Inflammatory change around the pancreas with heterogeneous density of   the pancreas. Findings are consistent with acute pancreatitis. Please   correlate.           Radiation dose reduction techniques were utilized, including automated   exposure control and exposure modulation based on body size.           This report was finalized on 12/26/2023 4:29 PM by Dr. Noah Tom M.D on Workstation: [id number]          XR Chest 1 View   Final Result   FINDINGS AND IMPRESSION:   Lungs are hypoinflated. Mild asymmetric pulmonary pacification is   present within the left lung base representing atelectasis versus   pneumonia in the  appropriate clinical context correlation with patient   history is recommended follow-up chest CT if clinically indicated. Given   the somewhat focal appearance, recommend follow-up chest radiograph in 6   to 8 weeks to ensure resolution and exclude any possibility neoplasm.   There appears to be a moderate to large hiatal hernia. Cardiac   silhouette is accentuated by low lung volumes..       This report was finalized on 12/22/2023 12:03 AM by Dr. Soy Yanez M.D on Workstation: [id number]          CT Head Without Contrast   Final Result         Electronically signed by Connie Barnes MD on 12-21-23 at 2303          Assessment & Plan     Active Hospital Problems    Diagnosis     **DKA (diabetic ketoacidosis)     Hypernatremia     Leukocytosis     New onset type 2 diabetes mellitus     Dehydration     Abnormal CXR     Dysphagia        Assessment:  Pancreatitis  Dysphagia  Esophageal stenosis-dilated  6 cm hiatal hernia  Nonbleeding gastric ulcer with erosions  New diagnosis of T2DM    These problems are new to me.  Plan:  S/p EGD on 12/28 revealing a 6 cm hiatal hernia, and a benign stenosis at the GE junction that was traversed and dilated with a balloon dilator to 15 mm.  Patient was noted to have nonbleeding gastric ulcer with pigmented material and gastric erosions with no stigmata of recent bleeding.  Patient still reports some mild pain in her left upper quadrant.  We will recheck lipase level in the morning.  We have made modifications to the patient's diet to also include low-fat and low irritant  Continue PPI twice daily and sucralfate x 1 month  We will continue to follow    I discussed the patients findings and my recommendations with patient.    MELIDA Yang APRN  [facility] [address] [address]  Office: [phone]

## 2024-01-01 NOTE — CASE MANAGEMENT/SOCIAL WORK
Case Management Discharge Note      Final Note: DC'd home. Goldy GUSMAN RN         Selected Continued Care - Discharged on 12/29/2023 Admission date: 12/21/2023 - Discharge disposition: Home or Self Care      Destination    No services have been selected for the patient.                Durable Medical Equipment    No services have been selected for the patient.                Dialysis/Infusion    No services have been selected for the patient.                Home Medical Care    No services have been selected for the patient.                Therapy    No services have been selected for the patient.                Community Resources    No services have been selected for the patient.                Community & DME    No services have been selected for the patient.                    Transportation Services  Private: Car    Final Discharge Disposition Code: 01 - home or self-care

## 2024-01-02 ENCOUNTER — READMISSION MANAGEMENT (OUTPATIENT)
Dept: CALL CENTER | Facility: HOSPITAL | Age: 64
End: 2024-01-02

## 2024-01-02 NOTE — OUTREACH NOTE
Medical Week 1 Survey      Flowsheet Row Responses   [facility] patient discharged from? [place]   Does the patient have one of the following disease processes/diagnoses(primary or secondary)? Other   Week 1 attempt successful? No   Unsuccessful attempts Attempt 1            Gloria ORTIZ - Licensed Nurse

## 2024-01-09 ENCOUNTER — READMISSION MANAGEMENT (OUTPATIENT)
Dept: CALL CENTER | Facility: HOSPITAL | Age: 64
End: 2024-01-09

## 2024-01-09 NOTE — OUTREACH NOTE
Medical Week 2 Survey      Flowsheet Row Responses   [facility] [place] patient discharged from? [place]   Does the patient have one of the following disease processes/diagnoses(primary or secondary)? Other   Week 2 attempt successful? No   Unsuccessful attempts Attempt 1            Gloria ORTIZ - Licensed Nurse

## 2024-01-15 ENCOUNTER — READMISSION MANAGEMENT (OUTPATIENT)
Dept: CALL CENTER | Facility: HOSPITAL | Age: 64
End: 2024-01-15

## 2024-01-16 NOTE — OUTREACH NOTE
Medical Week 3 Survey      Flowsheet Row Responses   [facility] patient discharged from? [place]   Does the patient have one of the following disease processes/diagnoses(primary or secondary)? Other   Week 3 attempt successful? No   Unsuccessful attempts Attempt 1   Revoke Decline to participate            Gloria ORTIZ - Licensed Nurse

## 2024-03-13 ENCOUNTER — TELEPHONE (OUTPATIENT)
Dept: GASTROENTEROLOGY | Facility: CLINIC | Age: 64
End: 2024-03-13

## (undated) DEVICE — MSK PROC CURAPLEX O2 2/ADAPT 7FT

## (undated) DEVICE — KT ORCA ORCAPOD DISP STRL

## (undated) DEVICE — ESOPHAGEAL/PYLORIC/COLONIC WIREGUIDED BALLOON DILATATION CATHETER: Brand: CRE WIREGUIDED

## (undated) DEVICE — ADAPT CLN BIOGUARD AIR/H2O DISP

## (undated) DEVICE — DEV INFL CRE STERIFLATE 60CC DISP

## (undated) DEVICE — LN SMPL CO2 SHTRM SD STREAM W/M LUER

## (undated) DEVICE — TUBING, SUCTION, 1/4" X 10', STRAIGHT: Brand: MEDLINE

## (undated) DEVICE — BLCK/BITE BLOX W/DENTL/RIM W/STRAP 54F

## (undated) DEVICE — SENSR O2 OXIMAX FNGR A/ 18IN NONSTR

## (undated) DEVICE — FRCP BX RADJAW4 NDL 2.8 240CM LG OG BX40